# Patient Record
Sex: MALE | Race: BLACK OR AFRICAN AMERICAN | NOT HISPANIC OR LATINO
[De-identification: names, ages, dates, MRNs, and addresses within clinical notes are randomized per-mention and may not be internally consistent; named-entity substitution may affect disease eponyms.]

---

## 2017-01-20 ENCOUNTER — RECORD ABSTRACTING (OUTPATIENT)
Age: 67
End: 2017-01-20

## 2017-01-20 DIAGNOSIS — R20.2 PARESTHESIA OF SKIN: ICD-10-CM

## 2017-01-20 DIAGNOSIS — Z82.49 FAMILY HISTORY OF ISCHEMIC HEART DISEASE AND OTHER DISEASES OF THE CIRCULATORY SYSTEM: ICD-10-CM

## 2017-01-20 DIAGNOSIS — Z84.89 FAMILY HISTORY OF OTHER SPECIFIED CONDITIONS: ICD-10-CM

## 2017-01-20 DIAGNOSIS — Z92.89 PERSONAL HISTORY OF OTHER MEDICAL TREATMENT: ICD-10-CM

## 2017-02-02 ENCOUNTER — APPOINTMENT (OUTPATIENT)
Dept: CARDIOLOGY | Facility: CLINIC | Age: 67
End: 2017-02-02

## 2018-07-11 ENCOUNTER — APPOINTMENT (OUTPATIENT)
Dept: CARDIOLOGY | Facility: CLINIC | Age: 68
End: 2018-07-11

## 2018-07-11 VITALS
BODY MASS INDEX: 30 KG/M2 | DIASTOLIC BLOOD PRESSURE: 78 MMHG | WEIGHT: 163 LBS | SYSTOLIC BLOOD PRESSURE: 128 MMHG | HEIGHT: 62 IN | HEART RATE: 61 BPM

## 2018-07-11 RX ORDER — SITAGLIPTIN 100 MG/1
100 TABLET, FILM COATED ORAL DAILY
Refills: 0 | Status: ACTIVE | COMMUNITY

## 2018-08-28 ENCOUNTER — APPOINTMENT (OUTPATIENT)
Dept: CARDIOLOGY | Facility: CLINIC | Age: 68
End: 2018-08-28

## 2019-01-08 ENCOUNTER — APPOINTMENT (OUTPATIENT)
Dept: CARDIOLOGY | Facility: CLINIC | Age: 69
End: 2019-01-08

## 2019-01-08 VITALS
BODY MASS INDEX: 29.08 KG/M2 | HEIGHT: 62 IN | DIASTOLIC BLOOD PRESSURE: 90 MMHG | HEART RATE: 87 BPM | SYSTOLIC BLOOD PRESSURE: 128 MMHG | WEIGHT: 158 LBS

## 2019-01-08 RX ORDER — VERAPAMIL HYDROCHLORIDE 120 MG/1
120 TABLET ORAL DAILY
Refills: 0 | Status: DISCONTINUED | COMMUNITY
End: 2019-01-08

## 2019-01-08 RX ORDER — ASPIRIN 81 MG
81 TABLET, DELAYED RELEASE (ENTERIC COATED) ORAL DAILY
Refills: 0 | Status: ACTIVE | COMMUNITY

## 2019-01-08 NOTE — HISTORY OF PRESENT ILLNESS
[FreeTextEntry1] : The patient has been feeling well. No chest pain  Said to have nonobstructive carotid disease.

## 2019-01-08 NOTE — ASSESSMENT
[FreeTextEntry1] : The patient has not had chest pain . ETT echo was negative for echo ischemia at high exercise load. . He has risk factors for CAD and has some degree of carotid disease. /

## 2019-01-08 NOTE — PHYSICAL EXAM
[General Appearance - Well Developed] : well developed [Normal Appearance] : normal appearance [Well Groomed] : well groomed [General Appearance - Well Nourished] : well nourished [No Deformities] : no deformities [General Appearance - In No Acute Distress] : no acute distress [Normal Conjunctiva] : the conjunctiva exhibited no abnormalities [Eyelids - No Xanthelasma] : the eyelids demonstrated no xanthelasmas [Normal Oral Mucosa] : normal oral mucosa [No Oral Pallor] : no oral pallor [No Oral Cyanosis] : no oral cyanosis [Respiration, Rhythm And Depth] : normal respiratory rhythm and effort [Exaggerated Use Of Accessory Muscles For Inspiration] : no accessory muscle use [Auscultation Breath Sounds / Voice Sounds] : lungs were clear to auscultation bilaterally [Abdomen Soft] : soft [Abdomen Tenderness] : non-tender [Abdomen Mass (___ Cm)] : no abdominal mass palpated [Abnormal Walk] : normal gait [Nail Clubbing] : no clubbing of the fingernails [Cyanosis, Localized] : no localized cyanosis [Petechial Hemorrhages (___cm)] : no petechial hemorrhages [Skin Color & Pigmentation] : normal skin color and pigmentation [] : no rash [No Venous Stasis] : no venous stasis [Skin Lesions] : no skin lesions [No Skin Ulcers] : no skin ulcer [No Xanthoma] : no  xanthoma was observed [Oriented To Time, Place, And Person] : oriented to person, place, and time [Affect] : the affect was normal [Mood] : the mood was normal [No Anxiety] : not feeling anxious [Normal Rate] : normal [Rhythm Regular] : regular [No Murmur] : no murmurs heard [2+] : left 2+ [No Pitting Edema] : no pitting edema present [FreeTextEntry1] : No JVD [Rt] : no varicose veins of the right leg [Lt] : no varicose veins of the left leg

## 2019-01-08 NOTE — REASON FOR VISIT
[Follow-Up - Clinic] : a clinic follow-up of [Carotid Artery Stenosis] : carotid stenosis [Hyperlipidemia] : hyperlipidemia [Hypertension] : hypertension

## 2019-07-08 ENCOUNTER — APPOINTMENT (OUTPATIENT)
Dept: CARDIOLOGY | Facility: CLINIC | Age: 69
End: 2019-07-08

## 2020-09-07 ENCOUNTER — INPATIENT (INPATIENT)
Facility: HOSPITAL | Age: 70
LOS: 1 days | Discharge: HOME | End: 2020-09-09
Attending: HOSPITALIST | Admitting: HOSPITALIST
Payer: MEDICARE

## 2020-09-07 VITALS
TEMPERATURE: 99 F | SYSTOLIC BLOOD PRESSURE: 154 MMHG | HEART RATE: 60 BPM | OXYGEN SATURATION: 99 % | RESPIRATION RATE: 16 BRPM | DIASTOLIC BLOOD PRESSURE: 78 MMHG

## 2020-09-07 DIAGNOSIS — Z88.2 ALLERGY STATUS TO SULFONAMIDES: ICD-10-CM

## 2020-09-07 LAB
ALBUMIN SERPL ELPH-MCNC: 4.9 G/DL — SIGNIFICANT CHANGE UP (ref 3.5–5.2)
ALP SERPL-CCNC: 52 U/L — SIGNIFICANT CHANGE UP (ref 30–115)
ALT FLD-CCNC: 34 U/L — SIGNIFICANT CHANGE UP (ref 0–41)
ANION GAP SERPL CALC-SCNC: 15 MMOL/L — HIGH (ref 7–14)
AST SERPL-CCNC: 138 U/L — HIGH (ref 0–41)
BASOPHILS # BLD AUTO: 0.02 K/UL — SIGNIFICANT CHANGE UP (ref 0–0.2)
BASOPHILS NFR BLD AUTO: 0.3 % — SIGNIFICANT CHANGE UP (ref 0–1)
BILIRUB DIRECT SERPL-MCNC: 0.2 MG/DL — SIGNIFICANT CHANGE UP (ref 0–0.2)
BILIRUB INDIRECT FLD-MCNC: 0.8 MG/DL — SIGNIFICANT CHANGE UP (ref 0.2–1.2)
BILIRUB SERPL-MCNC: 1 MG/DL — SIGNIFICANT CHANGE UP (ref 0.2–1.2)
BUN SERPL-MCNC: 17 MG/DL — SIGNIFICANT CHANGE UP (ref 10–20)
CALCIUM SERPL-MCNC: 9.2 MG/DL — SIGNIFICANT CHANGE UP (ref 8.5–10.1)
CHLORIDE SERPL-SCNC: 99 MMOL/L — SIGNIFICANT CHANGE UP (ref 98–110)
CO2 SERPL-SCNC: 22 MMOL/L — SIGNIFICANT CHANGE UP (ref 17–32)
CREAT SERPL-MCNC: 0.8 MG/DL — SIGNIFICANT CHANGE UP (ref 0.7–1.5)
EOSINOPHIL # BLD AUTO: 0 K/UL — SIGNIFICANT CHANGE UP (ref 0–0.7)
EOSINOPHIL NFR BLD AUTO: 0 % — SIGNIFICANT CHANGE UP (ref 0–8)
GLUCOSE BLDC GLUCOMTR-MCNC: 183 MG/DL — HIGH (ref 70–99)
GLUCOSE BLDC GLUCOMTR-MCNC: 192 MG/DL — HIGH (ref 70–99)
GLUCOSE SERPL-MCNC: 225 MG/DL — HIGH (ref 70–99)
HCT VFR BLD CALC: 43 % — SIGNIFICANT CHANGE UP (ref 42–52)
HGB BLD-MCNC: 14.3 G/DL — SIGNIFICANT CHANGE UP (ref 14–18)
IMM GRANULOCYTES NFR BLD AUTO: 0.3 % — SIGNIFICANT CHANGE UP (ref 0.1–0.3)
LACTATE SERPL-SCNC: 2.8 MMOL/L — HIGH (ref 0.7–2)
LIDOCAIN IGE QN: 21 U/L — SIGNIFICANT CHANGE UP (ref 7–60)
LYMPHOCYTES # BLD AUTO: 0.98 K/UL — LOW (ref 1.2–3.4)
LYMPHOCYTES # BLD AUTO: 14.6 % — LOW (ref 20.5–51.1)
MCHC RBC-ENTMCNC: 32.7 PG — HIGH (ref 27–31)
MCHC RBC-ENTMCNC: 33.3 G/DL — SIGNIFICANT CHANGE UP (ref 32–37)
MCV RBC AUTO: 98.4 FL — HIGH (ref 80–94)
MONOCYTES # BLD AUTO: 0.54 K/UL — SIGNIFICANT CHANGE UP (ref 0.1–0.6)
MONOCYTES NFR BLD AUTO: 8 % — SIGNIFICANT CHANGE UP (ref 1.7–9.3)
NEUTROPHILS # BLD AUTO: 5.15 K/UL — SIGNIFICANT CHANGE UP (ref 1.4–6.5)
NEUTROPHILS NFR BLD AUTO: 76.8 % — HIGH (ref 42.2–75.2)
NRBC # BLD: 0 /100 WBCS — SIGNIFICANT CHANGE UP (ref 0–0)
PLATELET # BLD AUTO: 204 K/UL — SIGNIFICANT CHANGE UP (ref 130–400)
POTASSIUM SERPL-MCNC: 4.7 MMOL/L — SIGNIFICANT CHANGE UP (ref 3.5–5)
POTASSIUM SERPL-SCNC: 4.7 MMOL/L — SIGNIFICANT CHANGE UP (ref 3.5–5)
PROT SERPL-MCNC: 7 G/DL — SIGNIFICANT CHANGE UP (ref 6–8)
RBC # BLD: 4.37 M/UL — LOW (ref 4.7–6.1)
RBC # FLD: 11.1 % — LOW (ref 11.5–14.5)
SARS-COV-2 RNA SPEC QL NAA+PROBE: SIGNIFICANT CHANGE UP
SODIUM SERPL-SCNC: 136 MMOL/L — SIGNIFICANT CHANGE UP (ref 135–146)
TROPONIN T SERPL-MCNC: 0.8 NG/ML — CRITICAL HIGH
WBC # BLD: 6.71 K/UL — SIGNIFICANT CHANGE UP (ref 4.8–10.8)
WBC # FLD AUTO: 6.71 K/UL — SIGNIFICANT CHANGE UP (ref 4.8–10.8)

## 2020-09-07 PROCEDURE — 93010 ELECTROCARDIOGRAM REPORT: CPT

## 2020-09-07 PROCEDURE — 76705 ECHO EXAM OF ABDOMEN: CPT | Mod: 26

## 2020-09-07 PROCEDURE — 99222 1ST HOSP IP/OBS MODERATE 55: CPT | Mod: 57

## 2020-09-07 PROCEDURE — 92941 PRQ TRLML REVSC TOT OCCL AMI: CPT | Mod: RC

## 2020-09-07 PROCEDURE — 71045 X-RAY EXAM CHEST 1 VIEW: CPT | Mod: 26

## 2020-09-07 PROCEDURE — 93458 L HRT ARTERY/VENTRICLE ANGIO: CPT | Mod: 26,XU

## 2020-09-07 PROCEDURE — 99285 EMERGENCY DEPT VISIT HI MDM: CPT | Mod: CS,25,GC

## 2020-09-07 RX ORDER — SODIUM CHLORIDE 9 MG/ML
1000 INJECTION, SOLUTION INTRAVENOUS
Refills: 0 | Status: DISCONTINUED | OUTPATIENT
Start: 2020-09-07 | End: 2020-09-09

## 2020-09-07 RX ORDER — SODIUM CHLORIDE 9 MG/ML
1000 INJECTION INTRAMUSCULAR; INTRAVENOUS; SUBCUTANEOUS
Refills: 0 | Status: DISCONTINUED | OUTPATIENT
Start: 2020-09-07 | End: 2020-09-08

## 2020-09-07 RX ORDER — ONDANSETRON 8 MG/1
4 TABLET, FILM COATED ORAL ONCE
Refills: 0 | Status: COMPLETED | OUTPATIENT
Start: 2020-09-07 | End: 2020-09-07

## 2020-09-07 RX ORDER — INFLUENZA VIRUS VACCINE 15; 15; 15; 15 UG/.5ML; UG/.5ML; UG/.5ML; UG/.5ML
0.5 SUSPENSION INTRAMUSCULAR ONCE
Refills: 0 | Status: COMPLETED | OUTPATIENT
Start: 2020-09-07 | End: 2020-09-09

## 2020-09-07 RX ORDER — DEXTROSE 50 % IN WATER 50 %
25 SYRINGE (ML) INTRAVENOUS ONCE
Refills: 0 | Status: DISCONTINUED | OUTPATIENT
Start: 2020-09-07 | End: 2020-09-09

## 2020-09-07 RX ORDER — TICAGRELOR 90 MG/1
90 TABLET ORAL EVERY 12 HOURS
Refills: 0 | Status: DISCONTINUED | OUTPATIENT
Start: 2020-09-08 | End: 2020-09-08

## 2020-09-07 RX ORDER — ASPIRIN/CALCIUM CARB/MAGNESIUM 324 MG
81 TABLET ORAL DAILY
Refills: 0 | Status: DISCONTINUED | OUTPATIENT
Start: 2020-09-08 | End: 2020-09-09

## 2020-09-07 RX ORDER — HYOSCYAMINE SULFATE 0.13 MG
1 TABLET ORAL
Qty: 0 | Refills: 0 | DISCHARGE

## 2020-09-07 RX ORDER — INSULIN GLARGINE 100 [IU]/ML
14 INJECTION, SOLUTION SUBCUTANEOUS AT BEDTIME
Refills: 0 | Status: DISCONTINUED | OUTPATIENT
Start: 2020-09-07 | End: 2020-09-08

## 2020-09-07 RX ORDER — GLUCAGON INJECTION, SOLUTION 0.5 MG/.1ML
1 INJECTION, SOLUTION SUBCUTANEOUS ONCE
Refills: 0 | Status: DISCONTINUED | OUTPATIENT
Start: 2020-09-07 | End: 2020-09-09

## 2020-09-07 RX ORDER — ATORVASTATIN CALCIUM 80 MG/1
80 TABLET, FILM COATED ORAL AT BEDTIME
Refills: 0 | Status: DISCONTINUED | OUTPATIENT
Start: 2020-09-07 | End: 2020-09-09

## 2020-09-07 RX ORDER — FAMOTIDINE 10 MG/ML
20 INJECTION INTRAVENOUS ONCE
Refills: 0 | Status: COMPLETED | OUTPATIENT
Start: 2020-09-07 | End: 2020-09-07

## 2020-09-07 RX ORDER — INSULIN LISPRO 100/ML
VIAL (ML) SUBCUTANEOUS AT BEDTIME
Refills: 0 | Status: DISCONTINUED | OUTPATIENT
Start: 2020-09-07 | End: 2020-09-08

## 2020-09-07 RX ORDER — INSULIN LISPRO 100/ML
4 VIAL (ML) SUBCUTANEOUS
Refills: 0 | Status: DISCONTINUED | OUTPATIENT
Start: 2020-09-07 | End: 2020-09-09

## 2020-09-07 RX ORDER — DEXTROSE 50 % IN WATER 50 %
15 SYRINGE (ML) INTRAVENOUS ONCE
Refills: 0 | Status: DISCONTINUED | OUTPATIENT
Start: 2020-09-07 | End: 2020-09-09

## 2020-09-07 RX ORDER — METOPROLOL TARTRATE 50 MG
25 TABLET ORAL
Refills: 0 | Status: DISCONTINUED | OUTPATIENT
Start: 2020-09-07 | End: 2020-09-09

## 2020-09-07 RX ORDER — DEXTROSE 50 % IN WATER 50 %
12.5 SYRINGE (ML) INTRAVENOUS ONCE
Refills: 0 | Status: DISCONTINUED | OUTPATIENT
Start: 2020-09-07 | End: 2020-09-09

## 2020-09-07 RX ORDER — LOSARTAN POTASSIUM 100 MG/1
50 TABLET, FILM COATED ORAL DAILY
Refills: 0 | Status: DISCONTINUED | OUTPATIENT
Start: 2020-09-08 | End: 2020-09-09

## 2020-09-07 RX ORDER — METOPROLOL TARTRATE 50 MG
25 TABLET ORAL
Refills: 0 | Status: DISCONTINUED | OUTPATIENT
Start: 2020-09-07 | End: 2020-09-07

## 2020-09-07 RX ORDER — ASPIRIN/CALCIUM CARB/MAGNESIUM 324 MG
325 TABLET ORAL ONCE
Refills: 0 | Status: COMPLETED | OUTPATIENT
Start: 2020-09-07 | End: 2020-09-07

## 2020-09-07 RX ORDER — PANTOPRAZOLE SODIUM 20 MG/1
1 TABLET, DELAYED RELEASE ORAL
Qty: 0 | Refills: 0 | DISCHARGE

## 2020-09-07 RX ORDER — CHLORHEXIDINE GLUCONATE 213 G/1000ML
1 SOLUTION TOPICAL
Refills: 0 | Status: DISCONTINUED | OUTPATIENT
Start: 2020-09-07 | End: 2020-09-09

## 2020-09-07 RX ORDER — FLUTICASONE PROPIONATE 220 MCG
1 AEROSOL WITH ADAPTER (GRAM) INHALATION
Qty: 0 | Refills: 0 | DISCHARGE

## 2020-09-07 RX ORDER — SODIUM CHLORIDE 9 MG/ML
1000 INJECTION INTRAMUSCULAR; INTRAVENOUS; SUBCUTANEOUS ONCE
Refills: 0 | Status: COMPLETED | OUTPATIENT
Start: 2020-09-07 | End: 2020-09-07

## 2020-09-07 RX ORDER — ENOXAPARIN SODIUM 100 MG/ML
40 INJECTION SUBCUTANEOUS DAILY
Refills: 0 | Status: DISCONTINUED | OUTPATIENT
Start: 2020-09-07 | End: 2020-09-09

## 2020-09-07 RX ORDER — SITAGLIPTIN 50 MG/1
1 TABLET, FILM COATED ORAL
Qty: 0 | Refills: 0 | DISCHARGE

## 2020-09-07 RX ADMIN — SODIUM CHLORIDE 1000 MILLILITER(S): 9 INJECTION INTRAMUSCULAR; INTRAVENOUS; SUBCUTANEOUS at 15:36

## 2020-09-07 RX ADMIN — ONDANSETRON 4 MILLIGRAM(S): 8 TABLET, FILM COATED ORAL at 15:35

## 2020-09-07 RX ADMIN — FAMOTIDINE 20 MILLIGRAM(S): 10 INJECTION INTRAVENOUS at 15:36

## 2020-09-07 RX ADMIN — ATORVASTATIN CALCIUM 80 MILLIGRAM(S): 80 TABLET, FILM COATED ORAL at 21:34

## 2020-09-07 RX ADMIN — Medication 325 MILLIGRAM(S): at 19:17

## 2020-09-07 RX ADMIN — SODIUM CHLORIDE 75 MILLILITER(S): 9 INJECTION INTRAMUSCULAR; INTRAVENOUS; SUBCUTANEOUS at 21:53

## 2020-09-07 NOTE — H&P ADULT - NSHPPHYSICALEXAM_GEN_ALL_CORE
PHYSICAL EXAM:  GENERAL: NAD, lying in bed comfortably  CHEST/LUNG: Clear to auscultation bilaterally; s. Unlabored respirations  HEART: Regular rate and rhythm; No murmurs, rubs, or gallops  ABDOMEN: Bowel sounds present; Soft, Nontender, Nondistended. No hepatomegaly  EXTREMITIES:  2+ Peripheral Pulses, brisk capillary refill. No clubbing, cyanosis, or edema  NERVOUS SYSTEM:  Alert & Oriented X3, speech clear. No deficits   MSK: FROM all 4 extremities, full and equal strength

## 2020-09-07 NOTE — CHART NOTE - NSCHARTNOTEFT_GEN_A_CORE
PRE-OP DIAGNOSIS: NSTEMI    PROCEDURE:[X] LHC                         [X] Coronary angiography                         [  ] [facility]  Physician: Dr. Noel  [place]: Dr. Barbosa / Dr. Seals    ANESTHESIA TYPE:  [  ]General Anesthesia  [ x ] Sedation  [  ] Local/Regional    ESTIMATED BLOOD LOSS:    10   mL    CONDITION  [  ] Critical  [  ] Serious  [  ]Fair  [X]Good    IV CONTRAST:      130       mL    FINDINGS  Left Heart Catheterization:  LVEF%: 60  LVEDP:  Significant single vessel disease s/p PCI    ACCESS:    [X] right radial artery  [ ] right femoral artery    LEFT HEART CATHETERIZATION                                    Left main:   LAD:  Prox: Mild luminal irregularities  Mid: 2 lesions, 30% and 50% lesions  Distal: mild luminal irregularities  Diag: ostial and prox segment 80% stenosis    Left Circumflex: Mild luminal irregularities  OM: Mild luminal irregularities    Right Coronary Artery: 100% mid RCA thrombotic occlusion, s/p PCI; moderate occlusive disease   RPLS severe atherosclerosis      INTERVENTION: PCI to RCA  IMPLANTS: MANASA to RCA    SPECIMEN REMOVED: N/A      POST-OP DIAGNOSIS: NSTEMI      PLAN OF CARE  - Admit to CCU  - NS at 75cc/hr  - Aspirin 81mg daily, Ticagrelor 90mg BID, metoprolol tartrate 25mg BID, Losartan 50mg daily (to start on 9/7/2020 and hold if SBP<100 mmHg), switch to Atorvastatin 80mg at bedtime  - Check cbc, cmp and cardiac enzymes in 4 hours and in AM  - Check EKG upon arrival to CCU and daily while in CCU PRE-OP DIAGNOSIS: NSTEMI    PROCEDURE:[X] LHC                         [X] Coronary angiography                         [  ] [facility]  Physician: Dr. Noel  [place]: Dr. Barbosa / Dr. Seals    ANESTHESIA TYPE:  [  ]General Anesthesia  [ x ] Sedation  [  ] Local/Regional    ESTIMATED BLOOD LOSS:    10   mL    CONDITION  [  ] Critical  [  ] Serious  [  ]Fair  [X]Good    IV CONTRAST:      130       mL    FINDINGS  Left Heart Catheterization:  LVEF%: 60  LVEDP:  Significant single vessel disease s/p PCI    ACCESS:    [X] right radial artery  [ ] right femoral artery    LEFT HEART CATHETERIZATION                                    Left main:   LAD:  Prox: Mild luminal irregularities  Mid: 2 lesions, 30% and 50% lesions  Distal: mild luminal irregularities  Diag: ostial and prox segment 80% stenosis    Left Circumflex: Mild luminal irregularities  OM: Mild luminal irregularities    Right Coronary Artery: 100% mid RCA thrombotic occlusion, s/p PCI; moderate occlusive disease   RPLS severe atherosclerosis      INTERVENTION: PCI to RCA  IMPLANTS: MANASA to RCA    SPECIMEN REMOVED: N/A      POST-OP DIAGNOSIS: NSTEMI      PLAN OF CARE  - Admit to CCU  - NS at 75cc/hr  - Aspirin 81mg daily, Ticagrelor 90mg BID, metoprolol tartrate 25mg BID, Losartan 50mg daily (to start on 9/7/2020 and hold if SBP<100 mmHg), switch to Atorvastatin 80mg at bedtime  - Check cbc, cmp and cardiac enzymes in 4 hours and in AM  - Check EKG upon arrival to CCU and daily while in CCU  - Give insulin for DM; hold metformin for 48 hours  - DVT prophylaxis PRE-OP DIAGNOSIS: NSTEMI    PROCEDURE:[X] LHC                         [X] Coronary angiography                         [  ] [facility]  Physician: Dr. Noel  [place]: Dr. Barbosa / Dr. Seals    ANESTHESIA TYPE:  [  ]General Anesthesia  [ x ] Sedation  [  ] Local/Regional    ESTIMATED BLOOD LOSS:    10   mL    CONDITION  [  ] Critical  [  ] Serious  [  ]Fair  [X]Good    IV CONTRAST:      130       mL    FINDINGS  Left Heart Catheterization:  LVEF%: 60  LVEDP:  Significant single vessel disease s/p PCI    ACCESS:    [X] right radial artery  [ ] right femoral artery    LEFT HEART CATHETERIZATION                                    Left main:   LAD:  Prox: Mild luminal irregularities  Mid: 2 lesions, 30% and 50% lesions  Distal: mild luminal irregularities  Diag: ostial and prox segment 80% stenosis    Left Circumflex: Mild luminal irregularities  OM: Mild luminal irregularities    Right Coronary Artery: 100% mid RCA thrombotic occlusion, s/p PCI; moderate occlusive disease   RPLS severe atherosclerosis      INTERVENTION: PCI to RCA  IMPLANTS: MANASA to RCA    SPECIMEN REMOVED: N/A      POST-OP DIAGNOSIS: NSTEMI      PLAN OF CARE  - Admit to CCU  - NS at 75cc/hr  - Aspirin 81mg daily, Ticagrelor 90mg BID, metoprolol tartrate 25mg BID, Losartan 50mg daily (to start on 9/7/2020 and hold if SBP<100 mmHg), switch to Atorvastatin 80mg at bedtime  - Check cbc, cmp and cardiac enzymes in 4 hours and in AM  - Check EKG upon arrival to CCU and daily while in CCU  - Give insulin for DM; hold metformin for 48 hours  - DVT prophylaxis  - Check 2D echo PRE-OP DIAGNOSIS: NSTEMI    PROCEDURE:[X] LHC                         [X] Coronary angiography                         [  ] [facility]  Physician: Dr. Noel  [place]: Dr. Barbosa / Dr. Seals    ANESTHESIA TYPE:  [  ]General Anesthesia  [ x ] Sedation  [  ] Local/Regional    ESTIMATED BLOOD LOSS:    10   mL    CONDITION  [  ] Critical  [  ] Serious  [  ]Fair  [X]Good    IV CONTRAST:      130       mL    FINDINGS  Left Heart Catheterization:  LVEF%: 60  LVEDP:  Significant single vessel disease s/p PCI    ACCESS:    [X] right radial artery  [ ] right femoral artery    LEFT HEART CATHETERIZATION                                    Left main:   LAD:  Prox: Mild luminal irregularities  Mid: 2 lesions, 30% and 50% lesions  Distal: mild luminal irregularities  Diag: ostial and prox segment 80% stenosis    Left Circumflex: Mild luminal irregularities  OM: Mild luminal irregularities    Right Coronary Artery: 100% mid RCA thrombotic occlusion, s/p PCI; moderate occlusive disease   RPLS severe atherosclerosis      INTERVENTION: PCI to RCA  IMPLANTS: MANASA to RCA    SPECIMEN REMOVED: N/A      POST-OP DIAGNOSIS: NSTEMI      PLAN OF CARE  - Admit to CCU  - NS at 75cc/hr  - Aspirin 81mg daily, Ticagrelor 90mg BID, metoprolol tartrate 25mg BID and HOLD verapamil, Losartan 50mg daily (to start on 9/7/2020 and hold if SBP<100 mmHg), switch to Atorvastatin 80mg at bedtime  - Check cbc, cmp and cardiac enzymes in 4 hours and in AM  - Check EKG upon arrival to CCU and daily while in CCU  - Give insulin for DM; hold metformin for 48 hours  - DVT prophylaxis  - Check 2D echo

## 2020-09-07 NOTE — ED ADULT NURSE NOTE - OBJECTIVE STATEMENT
Pt presents to ED c/o upper abdominal pain since yesterday that is worse after eating; pt also reports nausea, denies vomiting/diarrhea. Pt denies chest pain/SOB/headache/dizziness.

## 2020-09-07 NOTE — ED PROCEDURE NOTE - PROCEDURE ADDITIONAL DETAILS
limited ruq sono with no gallstones transverse and sagital views of gallbladder obtained. no stones. no cbd dilation.

## 2020-09-07 NOTE — CONSULT NOTE ADULT - SUBJECTIVE AND OBJECTIVE BOX
HPI:  69 y.o male patient with PMH of DM, HTN, "irregular heart beat", GERD, diverticulosis presented for non-resolving epigastric pain.  History goes back to the day prior to presentation at 4pm when the patient had barbecue. Following the meal, he suddenly felt weak and started sweating profusely, associated with epigastric pain. Episode lasted for about 30 minutes but the pain remained the same. He attributed to his regular indigestion and heartburn but the pain didn't resolve with pepcid and lasted the whole night. For this reason, he decided to present to the ED.  Patient describes the pain as sharp and pressure-like pain, epigastric, non-radiating, with no alleviating or relieving factors, associated with nausea. He denies fever, chills, melena, bright red blood per rectum or any other significant symptoms    PAST MEDICAL & SURGICAL HISTORY  Diverticulosis  GERD (gastroesophageal reflux disease)  Diabetes      FAMILY HISTORY:  FAMILY HISTORY:      SOCIAL HISTORY:  []smoker: never  []Alcohol: none  []Drug: none    ALLERGIES:  No Known Allergies      MEDICATIONS:  MEDICATIONS  (STANDING):    MEDICATIONS  (PRN):      HOME MEDICATIONS:  Home Medications:      VITALS:   T(F): 98.8 (09-07 @ 14:40), Max: 98.8 (09-07 @ 14:40)  HR: 60 (09-07 @ 14:40) (60 - 60)  BP: 154/78 (09-07 @ 14:40) (154/78 - 154/78)  BP(mean): --  RR: 16 (09-07 @ 14:40) (16 - 16)  SpO2: 99% (09-07 @ 14:40) (99% - 99%)    I&O's Summary      REVIEW OF SYSTEMS:  CONSTITUTIONAL: No weakness, fevers or chills  EYES: No visual changes  ENT: No vertigo or throat pain   NECK: No pain or stiffness  RESPIRATORY: No cough, wheezing, hemoptysis; No shortness of breath  CARDIOVASCULAR: Chest pain as described in HPI  GASTROINTESTINAL: No abdominal or epigastric pain. No nausea, vomiting, or hematemesis; No diarrhea or constipation. No melena or hematochezia.  GENITOURINARY: No dysuria, frequency or hematuria  NEUROLOGICAL: No numbness or weakness  SKIN: No itching, no rashes  MSK: No pain    PHYSICAL EXAM:  NEURO: patient is awake , alert and oriented  GEN: Not in acute distress  NECK: no thyroid enlargement, no JVD  LUNGS: Clear to auscultation bilaterally   CARDIOVASCULAR: S1/S2 present, RRR  ABD: Soft, non-tender, non-distended, mild epigastric tenderness  EXT: No TAMMIE  SKIN: Intact    LABS:                        14.3   6.71  )-----------( 204      ( 07 Sep 2020 15:14 )             43.0     09-07    136  |  99  |  17  ----------------------------<  225<H>  4.7   |  22  |  0.8    Ca    9.2      07 Sep 2020 15:14    TPro  7.0  /  Alb  4.9  /  TBili  1.0  /  DBili  0.2  /  AST  138<H>  /  ALT  34  /  AlkPhos  52  09-07      Lactate, Blood: 2.8 mmol/L <H> (09-07-20 @ 15:14)  Troponin T, Serum: 0.80 ng/mL <HH> (09-07-20 @ 15:14)    CARDIAC MARKERS ( 07 Sep 2020 15:14 )  x     / 0.80 ng/mL / x     / x     / x            Troponin trend:        RADIOLOGY:  -CXR:  -TTE:  -CCTA:  -STRESS TEST:  -CATHETERIZATION:    ECG: NSR, T-wave inversions in inferior leads    TELEMETRY EVENTS: HPI:  69 y.o male patient with PMH of DM, HTN, "irregular heart beat", GERD, diverticulosis presented for non-resolving epigastric pain.  History goes back to the day prior to presentation at 4pm when the patient had barbecue. Following the meal, he suddenly felt weak and started sweating profusely, associated with epigastric pain. Episode lasted for about 30 minutes but the pain remained the same. He attributed to his regular indigestion and heartburn but the pain didn't resolve with pepcid and lasted the whole night. For this reason, he decided to present to the ED.  Patient describes the pain as sharp and pressure-like pain, epigastric, non-radiating, with no alleviating or relieving factors, associated with nausea. He denies fever, chills, melena, bright red blood per rectum or any other significant symptoms    PAST MEDICAL & SURGICAL HISTORY  Diverticulosis  GERD (gastroesophageal reflux disease)  Diabetes      FAMILY HISTORY:  FAMILY HISTORY:      SOCIAL HISTORY:  []smoker: never  []Alcohol: none  []Drug: none    ALLERGIES:  No Known Allergies      MEDICATIONS:  MEDICATIONS  (STANDING):    MEDICATIONS  (PRN):      HOME MEDICATIONS:  Home Medications:      VITALS:   T(F): 98.8 (09-07 @ 14:40), Max: 98.8 (09-07 @ 14:40)  HR: 60 (09-07 @ 14:40) (60 - 60)  BP: 154/78 (09-07 @ 14:40) (154/78 - 154/78)  BP(mean): --  RR: 16 (09-07 @ 14:40) (16 - 16)  SpO2: 99% (09-07 @ 14:40) (99% - 99%)    I&O's Summary      REVIEW OF SYSTEMS:  CONSTITUTIONAL: No weakness, fevers or chills  EYES: No visual changes  ENT: No vertigo or throat pain   NECK: No pain or stiffness  RESPIRATORY: No cough, wheezing, hemoptysis; No shortness of breath  CARDIOVASCULAR: Chest pain as described in HPI  GASTROINTESTINAL: No abdominal or epigastric pain. No nausea, vomiting, or hematemesis; No diarrhea or constipation. No melena or hematochezia.  GENITOURINARY: No dysuria, frequency or hematuria  NEUROLOGICAL: No numbness or weakness  SKIN: No itching, no rashes  MSK: No pain    PHYSICAL EXAM:  NEURO: patient is awake , alert and oriented  GEN: Not in acute distress  HEENT: NC/AT  NECK: no thyroid enlargement, no JVD  LUNGS: Clear to auscultation bilaterally   CARDIOVASCULAR: S1/S2 present, RRR  ABD: Soft, non-tender, non-distended, mild epigastric tenderness  EXT/MSK: No TAMMIE  SKIN: Intact    LABS:                        14.3   6.71  )-----------( 204      ( 07 Sep 2020 15:14 )             43.0     09-07    136  |  99  |  17  ----------------------------<  225<H>  4.7   |  22  |  0.8    Ca    9.2      07 Sep 2020 15:14    TPro  7.0  /  Alb  4.9  /  TBili  1.0  /  DBili  0.2  /  AST  138<H>  /  ALT  34  /  AlkPhos  52  09-07      Lactate, Blood: 2.8 mmol/L <H> (09-07-20 @ 15:14)  Troponin T, Serum: 0.80 ng/mL <HH> (09-07-20 @ 15:14)    CARDIAC MARKERS ( 07 Sep 2020 15:14 )  x     / 0.80 ng/mL / x     / x     / x            Troponin trend:        RADIOLOGY:  -CXR:  -TTE:  -CCTA:  -STRESS TEST:  -CATHETERIZATION:    ECG: NSR, T-wave inversions in inferior leads    TELEMETRY EVENTS:

## 2020-09-07 NOTE — CONSULT NOTE ADULT - ASSESSMENT
Consistent with delayed presentation of IWMI  ECG with inf/lateral ischemic changes  + troponin      Admit to telemetry  NPO for cardiac cath  Load with ASA  2d echo  C/w statin  DM control  Further recommendations after cath.  F/u with Dr. Bernard.

## 2020-09-07 NOTE — ED PROVIDER NOTE - NS ED ROS FT
Review of Systems:  •	CONSTITUTIONAL - No fever, No diaphoresis, No weight change  •	SKIN - No rash  •	HEMATOLOGIC - No abnormal bleeding or bruising  •	EYES - No eye pain, No blurred vision  •	ENT - No change in hearing, No sore throat, No neck pain, No rhinorrhea, No ear pain  •	RESPIRATORY - No shortness of breath, No cough  •	CARDIAC -No chest pain, No palpitations  •	GI - + epigastric abdominal pain, + nausea, No vomiting, No diarrhea, No constipation, No bright red blood per rectum or melena. No flank pain  •                 - No dysuria, frequency, hematuria.   •	ENDO - No polydypsia, No polyuria, No heat/cold intolerance  •	MUSCULOSKELETAL - No joint paint, No swelling, No back pain  •	NEUROLOGIC - No numbness, No focal weakness, No headache, No dizziness  All other systems negative, unless specified in HPI

## 2020-09-07 NOTE — ED PROVIDER NOTE - PHYSICAL EXAMINATION
CONSTITUTIONAL: Well-developed; well-nourished; in no acute distress.   SKIN: warm, dry  HEAD: Normocephalic; atraumatic.  EYES: no conjunctival injection. PERRL.   ENT: No nasal discharge; airway clear.  NECK: Supple; non tender.  CARD: S1, S2 normal; no murmurs, gallops, or rubs. Regular rate and rhythm.   RESP: No wheezes, rales or rhonchi.  ABD: soft, mildly tender in epigastric area, no rebound tenderness or guarding, no CVAT b/l  EXT: Normal ROM.  No clubbing, cyanosis or edema.   LYMPH: No acute cervical adenopathy.  NEURO: Alert, oriented, grossly unremarkable  PSYCH: Cooperative, appropriate.

## 2020-09-07 NOTE — ED PROVIDER NOTE - CARE PLAN
Principal Discharge DX:	NSTEMI (non-ST elevated myocardial infarction) Principal Discharge DX:	NSTEMI (non-ST elevated myocardial infarction)  Secondary Diagnosis:	Abdominal pain

## 2020-09-07 NOTE — H&P ADULT - HISTORY OF PRESENT ILLNESS
69 y.o male patient with PMH of DM, HTN, "irregular heart beat", GERD, diverticulosis presented for non-resolving epigastric pain.  History goes back to the day prior to presentation at 4pm when the patient had barbecue. Following the meal, he suddenly felt weak and started sweating profusely, associated with epigastric pain. Episode lasted for about 30 minutes but the pain remained the same. He attributed to his regular indigestion and heartburn but the pain didn't resolve with pepcid and lasted the whole night. For this reason, he decided to present to the ED.  Patient describes the pain as sharp and pressure-like pain, epigastric, non-radiating, with no alleviating or relieving factors, associated with nausea. He denies fever, chills, melena, bright red blood per rectum or any other significant symptoms

## 2020-09-07 NOTE — H&P ADULT - ASSESSMENT
#NSTEMI s/p cath   - ECG with inf/lateral ischemic changes  - elevated troponin  - Admit to telemetry    - Loaded with ASA  - fu 2d echo  - C/w statin  DM control  Further recommendations after cath.  F/u with Dr. Bernard.  - Admit to CCU  - NS at 75cc/hr  - Aspirin 81mg daily, Ticagrelor 90mg BID, metoprolol tartrate 25mg BID and HOLD verapamil, Losartan 50mg daily (to start on 9/7/2020 and hold if SBP<100 mmHg), switch to Atorvastatin 80mg at bedtime  - Check cbc, cmp and cardiac enzymes in 4 hours and in AM  - Check EKG upon arrival to CCU and daily while in CCU  - Give insulin for DM; hold metformin for 48 hours  - DVT prophylaxis  - Check 2D echo. #STEMI s/p cath   - ECG with inf/lateral ischemic changes prior to cath. EKG post cath was unchanged.   - elevated troponin  - Cath was done 9/7 with a MANASA placed on the RCA.   - started on Aspirin 81mg daily, Ticagrelor 90mg BID, metoprolol tartrate 25mg BID and HOLD verapamil, Losartan 50mg daily (to start on 9/7/2020 and hold if SBP<100 mmHg), switch to Atorvastatin 80mg at bedtime  - Continue NS at 75cc/hr   - fu cardiac enzymes in 4 hours and in AM  - fu EKG upon arrival to CCU and daily while in CCU  - fu 2d echo  - F/u with Dr. Bernard.    #DM  - Pateint is on metformin and januvia at home   - started on lantus and lispro with coverage  - monitor finger sticks       # GI PPx - Protonix   # DVT PPx - Lovenox 40 mg SubQ    # Activity -  (X) Increase as Tolerated  # Dispo -   Patient to be discharged when medically optimized.  # Code Status - (X) FULL      (X) Discussed Case and Plan with the Medical Attending.  . #STEMI s/p cath   - ECG with inf/lateral ischemic changes prior to cath. EKG post cath was unchanged.   - elevated troponin  - Cath was done 9/7 with a MANASA placed on the RCA.   - started on Aspirin 81mg daily, Ticagrelor 90mg BID, metoprolol tartrate 25mg BID and HOLD verapamil, Losartan 50mg daily (to start on 9/7/2020 and hold if SBP<100 mmHg), switch to Atorvastatin 80mg at bedtime  - Continue NS at 75cc/hr   - fu cardiac enzymes in 4 hours and in AM  - fu EKG upon arrival to CCU and daily while in CCU  - fu 2d echo  - F/u with Dr. Bernard.    #DM type 2 - uncontrolled  - Pateint is on metformin and januvia at home   - started on lantus and lispro with coverage  - monitor finger sticks       # GI PPx - Protonix   # DVT PPx - Lovenox 40 mg SubQ    # Activity -  (X) Increase as Tolerated  # Dispo -   Patient to be discharged when medically optimized.  # Code Status - (X) FULL      (X) Discussed Case and Plan with the Medical Attending.  .

## 2020-09-07 NOTE — H&P ADULT - ATTENDING COMMENTS
Pt seen and examined independently. He c/o dyspnea after Brilinta was started.   No chest pain or epigastric pain or other complaints.   Case discussed with cardiology NP who spoke to the cardiology attending - Brilinta will be discontinued and Effient will be started.     T(F): 99.3 (09-08-20 @ 09:34), Max: 100.1 (09-08-20 @ 04:00)  HR: 66 (09-08-20 @ 10:00) (48 - 68)  BP: 118/57 (09-08-20 @ 10:00) (116/44 - 154/78)  RR: 20 (09-08-20 @ 10:00) (16 - 21)  SpO2: 97% (09-08-20 @ 10:00) (97% - 100%) on RA    CAPILLARY BLOOD GLUCOSE  POCT Blood Glucose.: 184 mg/dL (08 Sep 2020 07:53)  POCT Blood Glucose.: 175 mg/dL (08 Sep 2020 00:15)  POCT Blood Glucose.: 192 mg/dL (07 Sep 2020 23:02)  POCT Blood Glucose.: 183 mg/dL (07 Sep 2020 19:39)    PHYSICAL EXAM:  GENERAL: NAD  HEAD:  Normocephalic  EYES:  conjunctiva and sclera clear  ENMT: Moist mucous membranes  NECK: Supple, No JVD  NERVOUS SYSTEM:  Alert & Oriented X3, Good concentration  CHEST/LUNG: Clear to percussion bilaterally; No rales, rhonchi, wheezing  HEART: Regular rate and rhythm; No murmurs  ABDOMEN: Soft, Nontender, Nondistended; Bowel sounds present  EXTREMITIES: No edema  right wrist + dressing    EKG reviewed  Telemetry reviewed    LABS:                        13.0   7.04  )-----------( 171      ( 08 Sep 2020 04:14 )             38.8     09-08    140  |  106  |  15  ----------------------------<  218<H>  4.0   |  21  |  0.9    Ca    8.7      08 Sep 2020 04:14  Mg     2.1     09-08    TPro  6.1  /  Alb  4.3  /  TBili  1.3<H>  /  DBili  x   /  AST  152<H>  /  ALT  35  /  AlkPhos  44  09-08      CARDIAC MARKERS ( 08 Sep 2020 04:14 )  x     / 2.38 ng/mL / x     / x     / x      CARDIAC MARKERS ( 08 Sep 2020 00:25 )  x     / 1.84 ng/mL / x     / x     / 184.6 ng/mL  CARDIAC MARKERS ( 07 Sep 2020 15:14 )  x     / 0.80 ng/mL / x     / x     / x          < from: Transthoracic Echocardiogram (09.08.20 @ 09:44) >    Summary:   1.Normal global left ventricular systolic function.   2. LV Ejection Fraction by [place]'s Method with a biplane EF of 71 %.   3. Normal left atrial size.   4. Normal right atrial size.   5. There is no evidence of pericardial effusion.   6. Mild thickening of the anterior and posterior mitral valve leaflets.   7. No evidence of mitral valve regurgitation.    < end of copied text >    Case discussed with resident  Care discussed with pt    Impression/Plan  1. STEMI s/p MANASA of RCA  CCU monitoring  continue ASA/metoprolol/atorvastatin  Effient now started in place of Brilinta due to dyspnea  losartan started and monitor BP  check lipid panel  cardio following  GI/DVT prophylaxis    2. DM type 2 on insulin - FS acceptable    3. HTN on metoprolol and losartan    I reviewed the resident's note and I agree with the history, physical exam, assessment and plan as outlined with additions as above.     PROGRESS NOTE HANDOFF    Pending: lipid panel, OOB and ambulate, resolution of dyspnea    Disposition: home Pt seen and examined independently. He c/o dyspnea after Brilinta was started.   No chest pain or epigastric pain or other complaints.   Case discussed with cardiology NP who spoke to the cardiology attending - Brilinta will be discontinued and Effient will be started.     T(F): 99.3 (09-08-20 @ 09:34), Max: 100.1 (09-08-20 @ 04:00)  HR: 66 (09-08-20 @ 10:00) (48 - 68)  BP: 118/57 (09-08-20 @ 10:00) (116/44 - 154/78)  RR: 20 (09-08-20 @ 10:00) (16 - 21)  SpO2: 97% (09-08-20 @ 10:00) (97% - 100%) on RA    CAPILLARY BLOOD GLUCOSE  POCT Blood Glucose.: 184 mg/dL (08 Sep 2020 07:53)  POCT Blood Glucose.: 175 mg/dL (08 Sep 2020 00:15)  POCT Blood Glucose.: 192 mg/dL (07 Sep 2020 23:02)  POCT Blood Glucose.: 183 mg/dL (07 Sep 2020 19:39)    PHYSICAL EXAM:  GENERAL: NAD  HEAD:  Normocephalic  EYES:  conjunctiva and sclera clear  ENMT: Moist mucous membranes  NECK: Supple, No JVD  NERVOUS SYSTEM:  Alert & Oriented X3, Good concentration  CHEST/LUNG: Clear to percussion bilaterally; No rales, rhonchi, wheezing  HEART: Regular rate and rhythm; No murmurs  ABDOMEN: Soft, Nontender, Nondistended; Bowel sounds present  EXTREMITIES: No edema  right wrist + dressing    EKG reviewed  Telemetry reviewed    LABS:                        13.0   7.04  )-----------( 171      ( 08 Sep 2020 04:14 )             38.8     09-08    140  |  106  |  15  ----------------------------<  218<H>  4.0   |  21  |  0.9    Ca    8.7      08 Sep 2020 04:14  Mg     2.1     09-08    TPro  6.1  /  Alb  4.3  /  TBili  1.3<H>  /  DBili  x   /  AST  152<H>  /  ALT  35  /  AlkPhos  44  09-08      CARDIAC MARKERS ( 08 Sep 2020 04:14 )  x     / 2.38 ng/mL / x     / x     / x      CARDIAC MARKERS ( 08 Sep 2020 00:25 )  x     / 1.84 ng/mL / x     / x     / 184.6 ng/mL  CARDIAC MARKERS ( 07 Sep 2020 15:14 )  x     / 0.80 ng/mL / x     / x     / x          < from: Transthoracic Echocardiogram (09.08.20 @ 09:44) >    Summary:   1.Normal global left ventricular systolic function.   2. LV Ejection Fraction by [place]'s Method with a biplane EF of 71 %.   3. Normal left atrial size.   4. Normal right atrial size.   5. There is no evidence of pericardial effusion.   6. Mild thickening of the anterior and posterior mitral valve leaflets.   7. No evidence of mitral valve regurgitation.    < end of copied text >    Case discussed with resident  Care discussed with pt    Impression/Plan  1. NSTEMI s/p MANASA of RCA  CCU monitoring  continue ASA/metoprolol/atorvastatin  Effient now started in place of Brilinta due to dyspnea  losartan started and monitor BP  check lipid panel  cardio following  GI/DVT prophylaxis    2. DM type 2 on insulin - FS acceptable    3. HTN on metoprolol and losartan    I reviewed the resident's note and I agree with the history, physical exam, assessment and plan as outlined with additions as above.     PROGRESS NOTE HANDOFF    Pending: lipid panel, OOB and ambulate, resolution of dyspnea    Disposition: home

## 2020-09-07 NOTE — ED PROVIDER NOTE - PROGRESS NOTE DETAILS
MQ: Trop 0.8, called cardiology, will come and see patient, still with pain Attending Note:   70 yo M PMH GERD, DM, diverticulosis here for abd pain. Pt says that yesterday after BBQ started having sharp, squeezing pain to mid abd associated with nausea. Non-radiating. No vomiting, fever, chills, or back pain. Pt was at nurse’s house who did his pulse and said it was in the 40s. Pt is still having pain. On exam: Tenderness to epigastric region. I&P: Epigastric abd pain. Bedside sono shows no gallstones. Will pursue CT. HR normal in ED. Will check EKG and troponin. Pt, however, has no CP or SOB. MQ: Cardiology will do cath, admit to tele GABRIELLE:   Pt p/w epigastric abd pain. Bedside sono WNL. Plan: CT abd mitch. Trop and EKG with signs of ischemia. Discussed with cardio and pt will be cathed this evening. Will cancel CT abd pelvis in light of anticipated bolus for cardiac cath

## 2020-09-07 NOTE — ED PROVIDER NOTE - OBJECTIVE STATEMENT
Patient is a 68 yo male with PMHx of DM on metformin, GERD, diverticulitis, and irregular heart rhythm on verapamil c/o epigastric abdominal pain since yesterday. Patient was at friend's house eating and at 4 PM, had sudden onset of epigastric abdominal pain, sharp, moderate, nonradiating, not worse with exertion, has been constant and still has now but improved. Took antacids without relief. Associated nausea, no vomiting. Denies fever, chills, chest pain, SOB, cough, abdominal pain, diarrhea, dysuria. Does not drink alcohol.

## 2020-09-08 LAB
A1C WITH ESTIMATED AVERAGE GLUCOSE RESULT: 7.1 % — HIGH (ref 4–5.6)
ALBUMIN SERPL ELPH-MCNC: 4.3 G/DL — SIGNIFICANT CHANGE UP (ref 3.5–5.2)
ALBUMIN SERPL ELPH-MCNC: 4.3 G/DL — SIGNIFICANT CHANGE UP (ref 3.5–5.2)
ALBUMIN SERPL ELPH-MCNC: 4.4 G/DL — SIGNIFICANT CHANGE UP (ref 3.5–5.2)
ALP SERPL-CCNC: 44 U/L — SIGNIFICANT CHANGE UP (ref 30–115)
ALP SERPL-CCNC: 45 U/L — SIGNIFICANT CHANGE UP (ref 30–115)
ALP SERPL-CCNC: 46 U/L — SIGNIFICANT CHANGE UP (ref 30–115)
ALT FLD-CCNC: 35 U/L — SIGNIFICANT CHANGE UP (ref 0–41)
ALT FLD-CCNC: 36 U/L — SIGNIFICANT CHANGE UP (ref 0–41)
ALT FLD-CCNC: 37 U/L — SIGNIFICANT CHANGE UP (ref 0–41)
ANION GAP SERPL CALC-SCNC: 13 MMOL/L — SIGNIFICANT CHANGE UP (ref 7–14)
ANION GAP SERPL CALC-SCNC: 13 MMOL/L — SIGNIFICANT CHANGE UP (ref 7–14)
ANION GAP SERPL CALC-SCNC: 14 MMOL/L — SIGNIFICANT CHANGE UP (ref 7–14)
APPEARANCE UR: CLEAR — SIGNIFICANT CHANGE UP
AST SERPL-CCNC: 152 U/L — HIGH (ref 0–41)
AST SERPL-CCNC: 182 U/L — HIGH (ref 0–41)
AST SERPL-CCNC: 188 U/L — HIGH (ref 0–41)
BASOPHILS # BLD AUTO: 0.01 K/UL — SIGNIFICANT CHANGE UP (ref 0–0.2)
BASOPHILS # BLD AUTO: 0.02 K/UL — SIGNIFICANT CHANGE UP (ref 0–0.2)
BASOPHILS NFR BLD AUTO: 0.1 % — SIGNIFICANT CHANGE UP (ref 0–1)
BASOPHILS NFR BLD AUTO: 0.3 % — SIGNIFICANT CHANGE UP (ref 0–1)
BILIRUB SERPL-MCNC: 0.9 MG/DL — SIGNIFICANT CHANGE UP (ref 0.2–1.2)
BILIRUB SERPL-MCNC: 1 MG/DL — SIGNIFICANT CHANGE UP (ref 0.2–1.2)
BILIRUB SERPL-MCNC: 1.3 MG/DL — HIGH (ref 0.2–1.2)
BILIRUB UR-MCNC: NEGATIVE — SIGNIFICANT CHANGE UP
BUN SERPL-MCNC: 11 MG/DL — SIGNIFICANT CHANGE UP (ref 10–20)
BUN SERPL-MCNC: 12 MG/DL — SIGNIFICANT CHANGE UP (ref 10–20)
BUN SERPL-MCNC: 15 MG/DL — SIGNIFICANT CHANGE UP (ref 10–20)
CALCIUM SERPL-MCNC: 8.7 MG/DL — SIGNIFICANT CHANGE UP (ref 8.5–10.1)
CALCIUM SERPL-MCNC: 8.7 MG/DL — SIGNIFICANT CHANGE UP (ref 8.5–10.1)
CALCIUM SERPL-MCNC: 8.8 MG/DL — SIGNIFICANT CHANGE UP (ref 8.5–10.1)
CHLORIDE SERPL-SCNC: 106 MMOL/L — SIGNIFICANT CHANGE UP (ref 98–110)
CHLORIDE SERPL-SCNC: 106 MMOL/L — SIGNIFICANT CHANGE UP (ref 98–110)
CHLORIDE SERPL-SCNC: 107 MMOL/L — SIGNIFICANT CHANGE UP (ref 98–110)
CK MB CFR SERPL CALC: 184.6 NG/ML — HIGH (ref 0.6–6.3)
CO2 SERPL-SCNC: 18 MMOL/L — SIGNIFICANT CHANGE UP (ref 17–32)
CO2 SERPL-SCNC: 18 MMOL/L — SIGNIFICANT CHANGE UP (ref 17–32)
CO2 SERPL-SCNC: 21 MMOL/L — SIGNIFICANT CHANGE UP (ref 17–32)
COLOR SPEC: SIGNIFICANT CHANGE UP
CREAT SERPL-MCNC: 0.8 MG/DL — SIGNIFICANT CHANGE UP (ref 0.7–1.5)
CREAT SERPL-MCNC: 0.8 MG/DL — SIGNIFICANT CHANGE UP (ref 0.7–1.5)
CREAT SERPL-MCNC: 0.9 MG/DL — SIGNIFICANT CHANGE UP (ref 0.7–1.5)
DIFF PNL FLD: NEGATIVE — SIGNIFICANT CHANGE UP
EOSINOPHIL # BLD AUTO: 0 K/UL — SIGNIFICANT CHANGE UP (ref 0–0.7)
EOSINOPHIL # BLD AUTO: 0 K/UL — SIGNIFICANT CHANGE UP (ref 0–0.7)
EOSINOPHIL NFR BLD AUTO: 0 % — SIGNIFICANT CHANGE UP (ref 0–8)
EOSINOPHIL NFR BLD AUTO: 0 % — SIGNIFICANT CHANGE UP (ref 0–8)
ESTIMATED AVERAGE GLUCOSE: 157 MG/DL — HIGH (ref 68–114)
GLUCOSE BLDC GLUCOMTR-MCNC: 144 MG/DL — HIGH (ref 70–99)
GLUCOSE BLDC GLUCOMTR-MCNC: 175 MG/DL — HIGH (ref 70–99)
GLUCOSE BLDC GLUCOMTR-MCNC: 184 MG/DL — HIGH (ref 70–99)
GLUCOSE BLDC GLUCOMTR-MCNC: 191 MG/DL — HIGH (ref 70–99)
GLUCOSE BLDC GLUCOMTR-MCNC: 266 MG/DL — HIGH (ref 70–99)
GLUCOSE SERPL-MCNC: 192 MG/DL — HIGH (ref 70–99)
GLUCOSE SERPL-MCNC: 198 MG/DL — HIGH (ref 70–99)
GLUCOSE SERPL-MCNC: 218 MG/DL — HIGH (ref 70–99)
GLUCOSE UR QL: ABNORMAL
HCT VFR BLD CALC: 38.8 % — LOW (ref 42–52)
HCT VFR BLD CALC: 41.2 % — LOW (ref 42–52)
HCV AB S/CO SERPL IA: 0.04 COI — SIGNIFICANT CHANGE UP
HCV AB SERPL-IMP: SIGNIFICANT CHANGE UP
HGB BLD-MCNC: 13 G/DL — LOW (ref 14–18)
HGB BLD-MCNC: 13.7 G/DL — LOW (ref 14–18)
IMM GRANULOCYTES NFR BLD AUTO: 0.1 % — SIGNIFICANT CHANGE UP (ref 0.1–0.3)
IMM GRANULOCYTES NFR BLD AUTO: 0.3 % — SIGNIFICANT CHANGE UP (ref 0.1–0.3)
KETONES UR-MCNC: ABNORMAL
LEUKOCYTE ESTERASE UR-ACNC: NEGATIVE — SIGNIFICANT CHANGE UP
LYMPHOCYTES # BLD AUTO: 0.71 K/UL — LOW (ref 1.2–3.4)
LYMPHOCYTES # BLD AUTO: 0.92 K/UL — LOW (ref 1.2–3.4)
LYMPHOCYTES # BLD AUTO: 10.1 % — LOW (ref 20.5–51.1)
LYMPHOCYTES # BLD AUTO: 12.6 % — LOW (ref 20.5–51.1)
MAGNESIUM SERPL-MCNC: 2.1 MG/DL — SIGNIFICANT CHANGE UP (ref 1.8–2.4)
MCHC RBC-ENTMCNC: 32.7 PG — HIGH (ref 27–31)
MCHC RBC-ENTMCNC: 32.7 PG — HIGH (ref 27–31)
MCHC RBC-ENTMCNC: 33.3 G/DL — SIGNIFICANT CHANGE UP (ref 32–37)
MCHC RBC-ENTMCNC: 33.5 G/DL — SIGNIFICANT CHANGE UP (ref 32–37)
MCV RBC AUTO: 97.5 FL — HIGH (ref 80–94)
MCV RBC AUTO: 98.3 FL — HIGH (ref 80–94)
MONOCYTES # BLD AUTO: 0.67 K/UL — HIGH (ref 0.1–0.6)
MONOCYTES # BLD AUTO: 0.76 K/UL — HIGH (ref 0.1–0.6)
MONOCYTES NFR BLD AUTO: 10.8 % — HIGH (ref 1.7–9.3)
MONOCYTES NFR BLD AUTO: 9.2 % — SIGNIFICANT CHANGE UP (ref 1.7–9.3)
NEUTROPHILS # BLD AUTO: 5.54 K/UL — SIGNIFICANT CHANGE UP (ref 1.4–6.5)
NEUTROPHILS # BLD AUTO: 5.69 K/UL — SIGNIFICANT CHANGE UP (ref 1.4–6.5)
NEUTROPHILS NFR BLD AUTO: 77.8 % — HIGH (ref 42.2–75.2)
NEUTROPHILS NFR BLD AUTO: 78.7 % — HIGH (ref 42.2–75.2)
NITRITE UR-MCNC: NEGATIVE — SIGNIFICANT CHANGE UP
NRBC # BLD: 0 /100 WBCS — SIGNIFICANT CHANGE UP (ref 0–0)
NRBC # BLD: 0 /100 WBCS — SIGNIFICANT CHANGE UP (ref 0–0)
PH UR: 6 — SIGNIFICANT CHANGE UP (ref 5–8)
PLATELET # BLD AUTO: 161 K/UL — SIGNIFICANT CHANGE UP (ref 130–400)
PLATELET # BLD AUTO: 171 K/UL — SIGNIFICANT CHANGE UP (ref 130–400)
POTASSIUM SERPL-MCNC: 4 MMOL/L — SIGNIFICANT CHANGE UP (ref 3.5–5)
POTASSIUM SERPL-MCNC: 4.6 MMOL/L — SIGNIFICANT CHANGE UP (ref 3.5–5)
POTASSIUM SERPL-MCNC: 5.1 MMOL/L — HIGH (ref 3.5–5)
POTASSIUM SERPL-SCNC: 4 MMOL/L — SIGNIFICANT CHANGE UP (ref 3.5–5)
POTASSIUM SERPL-SCNC: 4.6 MMOL/L — SIGNIFICANT CHANGE UP (ref 3.5–5)
POTASSIUM SERPL-SCNC: 5.1 MMOL/L — HIGH (ref 3.5–5)
PROT SERPL-MCNC: 6.1 G/DL — SIGNIFICANT CHANGE UP (ref 6–8)
PROT SERPL-MCNC: 6.4 G/DL — SIGNIFICANT CHANGE UP (ref 6–8)
PROT SERPL-MCNC: 6.4 G/DL — SIGNIFICANT CHANGE UP (ref 6–8)
PROT UR-MCNC: NEGATIVE — SIGNIFICANT CHANGE UP
RBC # BLD: 3.98 M/UL — LOW (ref 4.7–6.1)
RBC # BLD: 4.19 M/UL — LOW (ref 4.7–6.1)
RBC # FLD: 11.2 % — LOW (ref 11.5–14.5)
RBC # FLD: 11.4 % — LOW (ref 11.5–14.5)
SARS-COV-2 IGG SERPL QL IA: NEGATIVE — SIGNIFICANT CHANGE UP
SARS-COV-2 IGM SERPL IA-ACNC: <3.8 AU/ML — SIGNIFICANT CHANGE UP
SODIUM SERPL-SCNC: 137 MMOL/L — SIGNIFICANT CHANGE UP (ref 135–146)
SODIUM SERPL-SCNC: 139 MMOL/L — SIGNIFICANT CHANGE UP (ref 135–146)
SODIUM SERPL-SCNC: 140 MMOL/L — SIGNIFICANT CHANGE UP (ref 135–146)
SP GR SPEC: 1.03 — HIGH (ref 1.01–1.02)
TROPONIN T SERPL-MCNC: 1.84 NG/ML — CRITICAL HIGH
TROPONIN T SERPL-MCNC: 2.38 NG/ML — CRITICAL HIGH
UROBILINOGEN FLD QL: SIGNIFICANT CHANGE UP
WBC # BLD: 7.04 K/UL — SIGNIFICANT CHANGE UP (ref 4.8–10.8)
WBC # BLD: 7.31 K/UL — SIGNIFICANT CHANGE UP (ref 4.8–10.8)
WBC # FLD AUTO: 7.04 K/UL — SIGNIFICANT CHANGE UP (ref 4.8–10.8)
WBC # FLD AUTO: 7.31 K/UL — SIGNIFICANT CHANGE UP (ref 4.8–10.8)

## 2020-09-08 PROCEDURE — 93306 TTE W/DOPPLER COMPLETE: CPT | Mod: 26

## 2020-09-08 PROCEDURE — 99223 1ST HOSP IP/OBS HIGH 75: CPT

## 2020-09-08 PROCEDURE — 93010 ELECTROCARDIOGRAM REPORT: CPT

## 2020-09-08 PROCEDURE — 99232 SBSQ HOSP IP/OBS MODERATE 35: CPT

## 2020-09-08 RX ORDER — PANTOPRAZOLE SODIUM 20 MG/1
40 TABLET, DELAYED RELEASE ORAL
Refills: 0 | Status: DISCONTINUED | OUTPATIENT
Start: 2020-09-08 | End: 2020-09-09

## 2020-09-08 RX ORDER — PRASUGREL 5 MG/1
30 TABLET, FILM COATED ORAL ONCE
Refills: 0 | Status: COMPLETED | OUTPATIENT
Start: 2020-09-08 | End: 2020-09-08

## 2020-09-08 RX ORDER — INSULIN LISPRO 100/ML
VIAL (ML) SUBCUTANEOUS
Refills: 0 | Status: DISCONTINUED | OUTPATIENT
Start: 2020-09-08 | End: 2020-09-09

## 2020-09-08 RX ORDER — METFORMIN HYDROCHLORIDE 850 MG/1
1 TABLET ORAL
Qty: 0 | Refills: 0 | DISCHARGE

## 2020-09-08 RX ORDER — PRASUGREL 5 MG/1
1 TABLET, FILM COATED ORAL
Qty: 30 | Refills: 0
Start: 2020-09-08 | End: 2020-10-07

## 2020-09-08 RX ORDER — INSULIN GLARGINE 100 [IU]/ML
16 INJECTION, SOLUTION SUBCUTANEOUS AT BEDTIME
Refills: 0 | Status: DISCONTINUED | OUTPATIENT
Start: 2020-09-08 | End: 2020-09-09

## 2020-09-08 RX ORDER — PRASUGREL 5 MG/1
10 TABLET, FILM COATED ORAL DAILY
Refills: 0 | Status: DISCONTINUED | OUTPATIENT
Start: 2020-09-09 | End: 2020-09-09

## 2020-09-08 RX ADMIN — INSULIN GLARGINE 16 UNIT(S): 100 INJECTION, SOLUTION SUBCUTANEOUS at 21:18

## 2020-09-08 RX ADMIN — Medication 81 MILLIGRAM(S): at 11:47

## 2020-09-08 RX ADMIN — Medication 4 UNIT(S): at 12:00

## 2020-09-08 RX ADMIN — Medication 4 UNIT(S): at 16:59

## 2020-09-08 RX ADMIN — PANTOPRAZOLE SODIUM 40 MILLIGRAM(S): 20 TABLET, DELAYED RELEASE ORAL at 06:00

## 2020-09-08 RX ADMIN — PRASUGREL 30 MILLIGRAM(S): 5 TABLET, FILM COATED ORAL at 11:47

## 2020-09-08 RX ADMIN — Medication 1: at 16:59

## 2020-09-08 RX ADMIN — ATORVASTATIN CALCIUM 80 MILLIGRAM(S): 80 TABLET, FILM COATED ORAL at 21:19

## 2020-09-08 RX ADMIN — Medication 25 MILLIGRAM(S): at 08:25

## 2020-09-08 RX ADMIN — INSULIN GLARGINE 14 UNIT(S): 100 INJECTION, SOLUTION SUBCUTANEOUS at 00:16

## 2020-09-08 RX ADMIN — TICAGRELOR 90 MILLIGRAM(S): 90 TABLET ORAL at 06:00

## 2020-09-08 RX ADMIN — CHLORHEXIDINE GLUCONATE 1 APPLICATION(S): 213 SOLUTION TOPICAL at 05:08

## 2020-09-08 RX ADMIN — Medication 4 UNIT(S): at 08:25

## 2020-09-08 RX ADMIN — Medication 25 MILLIGRAM(S): at 18:22

## 2020-09-08 RX ADMIN — ENOXAPARIN SODIUM 40 MILLIGRAM(S): 100 INJECTION SUBCUTANEOUS at 11:48

## 2020-09-08 NOTE — PROGRESS NOTE ADULT - SUBJECTIVE AND OBJECTIVE BOX
Cardiology Follow up    NAHID SAM   69y Male  PAST MEDICAL & SURGICAL HISTORY:  Diverticulosis  GERD (gastroesophageal reflux disease)  Diabetes  No significant past surgical history     HPI:  69 y.o male patient with PMH of DM, HTN, "irregular heart beat", GERD, diverticulosis presented for non-resolving epigastric pain.  History goes back to the day prior to presentation at 4pm when the patient had barbecue. Following the meal, he suddenly felt weak and started sweating profusely, associated with epigastric pain. Episode lasted for about 30 minutes but the pain remained the same. He attributed to his regular indigestion and heartburn but the pain didn't resolve with pepcid and lasted the whole night. For this reason, he decided to present to the ED.  Patient describes the pain as sharp and pressure-like pain, epigastric, non-radiating, with no alleviating or relieving factors, associated with nausea. He denies fever, chills, melena, bright red blood per rectum or any other significant symptoms (07 Sep 2020 20:53)    Allergies    sulfa drugs (Rash)    Intolerances    Patient seen and examined at bedside. No acute events overnight.  Patient reported episodes of dyspnea - due to Brilinta.  Pt ambulated without issues/symptoms  Denies CP, SOB, palpitations, or dizziness  No events on telemetry overnight    Vital Signs Last 24 Hrs  T(C): 37.2 (08 Sep 2020 08:00), Max: 37.8 (08 Sep 2020 04:00)  T(F): 99 (08 Sep 2020 08:00), Max: 100.1 (08 Sep 2020 04:00)  HR: 64 (08 Sep 2020 08:00) (48 - 68)  BP: 125/58 (08 Sep 2020 08:00) (116/44 - 154/78)  BP(mean): 86 (08 Sep 2020 08:00) (60 - 104)  RR: 18 (08 Sep 2020 08:00) (16 - 21)  SpO2: 97% (08 Sep 2020 08:00) (97% - 100%)    MEDICATIONS  (STANDING):  aspirin  chewable 81 milliGRAM(s) Chew daily  atorvastatin 80 milliGRAM(s) Oral at bedtime  chlorhexidine 4% Liquid 1 Application(s) Topical <User Schedule>  dextrose 5%. 1000 milliLiter(s) (50 mL/Hr) IV Continuous <Continuous>  dextrose 50% Injectable 12.5 Gram(s) IV Push once  dextrose 50% Injectable 25 Gram(s) IV Push once  dextrose 50% Injectable 25 Gram(s) IV Push once  enoxaparin Injectable 40 milliGRAM(s) SubCutaneous daily  influenza   Vaccine 0.5 milliLiter(s) IntraMuscular once  insulin glargine Injectable (LANTUS) 14 Unit(s) SubCutaneous at bedtime  insulin lispro (HumaLOG) corrective regimen sliding scale   SubCutaneous at bedtime  insulin lispro Injectable (HumaLOG) 4 Unit(s) SubCutaneous three times a day before meals  losartan 50 milliGRAM(s) Oral daily  metoprolol tartrate 25 milliGRAM(s) Oral two times a day  pantoprazole    Tablet 40 milliGRAM(s) Oral before breakfast    MEDICATIONS  (PRN):  dextrose 40% Gel 15 Gram(s) Oral once PRN Blood Glucose LESS THAN 70 milliGRAM(s)/deciliter  glucagon  Injectable 1 milliGRAM(s) IntraMuscular once PRN Glucose LESS THAN 70 milligrams/deciliter    REVIEW OF SYSTEMS:          All negative except as mentioned in HPI    PHYSICAL EXAM:           CONSTITUTIONAL: Well-developed; well-nourished; in no acute distress  	SKIN: warm, dry  	HEAD: Normocephalic; atraumatic  	EYES: PERRL.  	ENT: No nasal discharge, airway clear, mucous membranes moist  	NECK: Supple; non tender.  	CARD: +S1, +S2, no murmurs, gallops, or rubs. Regular rate and rhythm    	RESP: No wheezes, rales or rhonchi. CTA B/L  	ABD: soft ntnd, + BS x 4 quadrants  	EXT: moves all extremities,  no clubbing, cyanosis or edema  	NEURO: Alert and oriented x3, no focal deficits          PSYCH: Cooperative, appropriate          VASCULAR:  + Rad / + PTs / +  DPs          EXTREMITY:             Right Radial: pressure dressing removed, access site soft, no hematoma, no pain, + pulses, no sign of infection, no numbness            ECG:   < from: 12 Lead ECG (09.08.20 @ 06:07) >  Ventricular Rate 56 BPM    Atrial Rate 56 BPM    P-R Interval 166 ms    QRS Duration 72 ms    Q-T Interval 436 ms    QTC Calculation(Bezet) 420 ms    P Axis 69 degrees    R Axis -45 degrees    T Axis 39 degrees    Diagnosis Line Sinus bradycardia with sinus arrhythmia  Left axis deviation  Possible Inferior infarct , age undetermined  Anterior infarct , age undetermined  Abnormal ECG    Confirmed by DOT OCASIO MD ([id number]) on 9/8/2020 7:46:49 AM                                                                                     2D ECHO:  < from: Transthoracic Echocardiogram (09.08.20 @ 09:44) >  Summary:   1.Normal global left ventricular systolic function.   2. LV Ejection Fraction by [place]'s Method with a biplane EF of 71 %.   3. Normal left atrial size.   4. Normal right atrial size.   5. There is no evidence of pericardial effusion.   6. Mild thickening of the anterior and posterior mitral valve leaflets.   7. No evidence of mitral valve regurgitation.    LABS:                        13.0   7.04  )-----------( 171      ( 08 Sep 2020 04:14 )             38.8     09-08    140  |  106  |  15  ----------------------------<  218<H>  4.0   |  21  |  0.9    Ca    8.7      08 Sep 2020 04:14  Mg     2.1     09-08    TPro  6.1  /  Alb  4.3  /  TBili  1.3<H>  /  DBili  x   /  AST  152<H>  /  ALT  35  /  AlkPhos  44  09-08    CARDIAC MARKERS ( 08 Sep 2020 04:14 )  x     / 2.38 ng/mL / x     / x     / x      CARDIAC MARKERS ( 08 Sep 2020 00:25 )  x     / 1.84 ng/mL / x     / x     / 184.6 ng/mL  CARDIAC MARKERS ( 07 Sep 2020 15:14 )  x     / 0.80 ng/mL / x     / x     / x        Magnesium, Serum: 2.1 mg/dL [1.8 - 2.4] (09-08-20 @ 04:14)  LIVER FUNCTIONS - ( 08 Sep 2020 04:14 )  Alb: 4.3 g/dL / Pro: 6.1 g/dL / ALK PHOS: 44 U/L / ALT: 35 U/L / AST: 152 U/L / GGT: x             A/P:  I discussed the case with Cardiologist Dr. Noel and recommend the following:    CAD, s/p IWMI, s/p PCI of RCA                      D/C Brilinta due to Dyspnea                    Give Effient 30 mg PO x1 now                   Start Effient 10 mg PO Daily on 9/9/2020                   Ambulate patient around the unit                    FS AC and HS, strict glucemic control                    F/U Lipid panel, A1C                   Hold Metformin x 48 hr after Cardiac Cath                   Continue DAPT ( Aspirin 81 mg PO Daily and Effient 10 mg PO Daily ), B-Blocker, Statin Therapy, ARB                   Patient pharmacy called in for Effient prescription and it is 0$ per month, medication available for a  today 9/8/2020                   Keep K = 4, Mg = 2                   Patient agreeing to take DAPT for at least one year or as directed by cardiologist                    Pt given instructions on importance of taking antiplatelet medication or risk acute stent thrombosis/death                   Post cath instructions, access site care and activity restrictions reviewed with patient                     Discussed with patient to return to hospital if experience chest pain, shortness breath, dizziness and site bleeding                   Aggressive risk factor modification, diet counseling, smoking cessation discussed with patient                       Monitor patient in CCU

## 2020-09-08 NOTE — PROGRESS NOTE ADULT - ASSESSMENT
#STEMI s/p cath   - ECG with inf/lateral ischemic changes prior to cath. EKG post cath was unchanged.   - elevated troponin  - Cath was done 9/7 with a MANASA placed on the RCA.   - started on Aspirin 81mg daily, Ticagrelor 90mg BID, metoprolol tartrate 25mg BID and HOLD verapamil, Losartan 50mg daily (to start on 9/7/2020 and hold if SBP<100 mmHg), switch to Atorvastatin 80mg at bedtime  - Continue NS at 75cc/hr   - fu cardiac enzymes in 4 hours and in AM  - fu EKG upon arrival to CCU and daily while in CCU  - fu 2d echo  - F/u with Dr. Bernard.    #DM  - Pateint is on metformin and januvia at home   - started on lantus and lispro with coverage  - monitor finger sticks       # GI PPx - Protonix   # DVT PPx - Lovenox 40 mg SubQ    # Activity -  (X) Increase as Tolerated  # Dispo -   Patient to be discharged when medically optimized.  # Code Status - (X) FULL #STEMI s/p cath   - ECG with inf/lateral ischemic changes prior to cath. EKG post cath was unchanged.   - elevated troponin  - Cath was done 9/7 with a MANASA placed on the RCA.   - c/w Aspirin 81mg daily, metoprolol tartrate 25mg BID, Atorvastatin 80mg at bedtime     - d/c Ticagrelor 90mg BID 2/2 dyspnea. Start Effient 10mg PO daily  - Continue NS at 75cc/hr   - fu cardiac enzymes in 4 hours and in AM  - fu EKG upon arrival to CCU and daily while in CCU  - fu 2d echo  - F/u with Dr. Bernard.    #DM  - Pateint is on metformin and januvia at home   - started on lantus and lispro with coverage  - monitor finger sticks       # GI PPx - Protonix   # DVT PPx - Lovenox 40 mg SubQ    # Activity -  (X) Increase as Tolerated  # Dispo -   Patient to be discharged when medically optimized.  # Code Status - (X) FULL

## 2020-09-08 NOTE — PROGRESS NOTE ADULT - SUBJECTIVE AND OBJECTIVE BOX
SUBJECTIVE:    Patient is a 69y old Male who presents with a chief complaint of NSTEMI (08 Sep 2020 10:29)    Currently admitted to medicine with the primary diagnosis of NSTEMI (non-ST elevated myocardial infarction)     Today is hospital day 1d. This morning he is resting comfortably in bed and reports no new issues or overnight events.     INTERVAL EVENTS:   no acute events overnight    PAST MEDICAL & SURGICAL HISTORY  Diverticulosis  GERD (gastroesophageal reflux disease)  Diabetes  No significant past surgical history      ALLERGIES:  sulfa drugs (Rash)    MEDICATIONS:  STANDING MEDICATIONS  aspirin  chewable 81 milliGRAM(s) Chew daily  atorvastatin 80 milliGRAM(s) Oral at bedtime  chlorhexidine 4% Liquid 1 Application(s) Topical <User Schedule>  dextrose 5%. 1000 milliLiter(s) IV Continuous <Continuous>  dextrose 50% Injectable 12.5 Gram(s) IV Push once  dextrose 50% Injectable 25 Gram(s) IV Push once  dextrose 50% Injectable 25 Gram(s) IV Push once  enoxaparin Injectable 40 milliGRAM(s) SubCutaneous daily  influenza   Vaccine 0.5 milliLiter(s) IntraMuscular once  insulin glargine Injectable (LANTUS) 14 Unit(s) SubCutaneous at bedtime  insulin lispro (HumaLOG) corrective regimen sliding scale   SubCutaneous at bedtime  insulin lispro Injectable (HumaLOG) 4 Unit(s) SubCutaneous three times a day before meals  losartan 50 milliGRAM(s) Oral daily  metoprolol tartrate 25 milliGRAM(s) Oral two times a day  pantoprazole    Tablet 40 milliGRAM(s) Oral before breakfast  prasugrel 30 milliGRAM(s) Oral once    PRN MEDICATIONS  dextrose 40% Gel 15 Gram(s) Oral once PRN  glucagon  Injectable 1 milliGRAM(s) IntraMuscular once PRN    VITALS:   T(F): 99.3  HR: 66  BP: 118/57  RR: 20  SpO2: 97%    LABS:                        13.0   7.04  )-----------( 171      ( 08 Sep 2020 04:14 )             38.8     09-08    140  |  106  |  15  ----------------------------<  218<H>  4.0   |  21  |  0.9    Ca    8.7      08 Sep 2020 04:14  Mg     2.1     09-08    TPro  6.1  /  Alb  4.3  /  TBili  1.3<H>  /  DBili  x   /  AST  152<H>  /  ALT  35  /  AlkPhos  44  09-08          Troponin T, Serum: 2.38 ng/mL <HH> (09-08-20 @ 04:14)  Troponin T, Serum: 1.84 ng/mL <HH> (09-08-20 @ 00:25)  Lactate, Blood: 2.8 mmol/L <H> (09-07-20 @ 15:14)  Troponin T, Serum: 0.80 ng/mL <HH> (09-07-20 @ 15:14)      CARDIAC MARKERS ( 08 Sep 2020 04:14 )  x     / 2.38 ng/mL / x     / x     / x      CARDIAC MARKERS ( 08 Sep 2020 00:25 )  x     / 1.84 ng/mL / x     / x     / 184.6 ng/mL  CARDIAC MARKERS ( 07 Sep 2020 15:14 )  x     / 0.80 ng/mL / x     / x     / x          RADIOLOGY:    PHYSICAL EXAM:  GEN: No acute distress  PULM/CHEST: Clear to auscultation bilaterally, no rales, rhonchi or wheezes   CVS: Regular rate and rhythm, S1-S2, no murmurs  ABD: Soft, non-tender, non-distended, +BS  EXT: No edema. Right radial dressing CDI  NEURO: AAOx3    [place] Catheter: SUBJECTIVE:    Patient is a 69y old Male who presents with a chief complaint of NSTEMI (08 Sep 2020 10:29)    Currently admitted to medicine with the primary diagnosis of NSTEMI (non-ST elevated myocardial infarction)     Today is hospital day 1d. This morning he is resting comfortably in bed and reports no new issues or overnight events.     INTERVAL EVENTS:   no new complaints    PAST MEDICAL & SURGICAL HISTORY  Diverticulosis  GERD (gastroesophageal reflux disease)  Diabetes  No significant past surgical history      ALLERGIES:  sulfa drugs (Rash)    MEDICATIONS:  STANDING MEDICATIONS  aspirin  chewable 81 milliGRAM(s) Chew daily  atorvastatin 80 milliGRAM(s) Oral at bedtime  chlorhexidine 4% Liquid 1 Application(s) Topical <User Schedule>  dextrose 5%. 1000 milliLiter(s) IV Continuous <Continuous>  dextrose 50% Injectable 12.5 Gram(s) IV Push once  dextrose 50% Injectable 25 Gram(s) IV Push once  dextrose 50% Injectable 25 Gram(s) IV Push once  enoxaparin Injectable 40 milliGRAM(s) SubCutaneous daily  influenza   Vaccine 0.5 milliLiter(s) IntraMuscular once  insulin glargine Injectable (LANTUS) 14 Unit(s) SubCutaneous at bedtime  insulin lispro (HumaLOG) corrective regimen sliding scale   SubCutaneous at bedtime  insulin lispro Injectable (HumaLOG) 4 Unit(s) SubCutaneous three times a day before meals  losartan 50 milliGRAM(s) Oral daily  metoprolol tartrate 25 milliGRAM(s) Oral two times a day  pantoprazole    Tablet 40 milliGRAM(s) Oral before breakfast  prasugrel 30 milliGRAM(s) Oral once    PRN MEDICATIONS  dextrose 40% Gel 15 Gram(s) Oral once PRN  glucagon  Injectable 1 milliGRAM(s) IntraMuscular once PRN    VITALS:   T(F): 99.3  HR: 66  BP: 118/57  RR: 20  SpO2: 97%    LABS:                        13.0   7.04  )-----------( 171      ( 08 Sep 2020 04:14 )             38.8     09-08    140  |  106  |  15  ----------------------------<  218<H>  4.0   |  21  |  0.9    Ca    8.7      08 Sep 2020 04:14  Mg     2.1     09-08    TPro  6.1  /  Alb  4.3  /  TBili  1.3<H>  /  DBili  x   /  AST  152<H>  /  ALT  35  /  AlkPhos  44  09-08          Troponin T, Serum: 2.38 ng/mL <HH> (09-08-20 @ 04:14)  Troponin T, Serum: 1.84 ng/mL <HH> (09-08-20 @ 00:25)  Lactate, Blood: 2.8 mmol/L <H> (09-07-20 @ 15:14)  Troponin T, Serum: 0.80 ng/mL <HH> (09-07-20 @ 15:14)      CARDIAC MARKERS ( 08 Sep 2020 04:14 )  x     / 2.38 ng/mL / x     / x     / x      CARDIAC MARKERS ( 08 Sep 2020 00:25 )  x     / 1.84 ng/mL / x     / x     / 184.6 ng/mL  CARDIAC MARKERS ( 07 Sep 2020 15:14 )  x     / 0.80 ng/mL / x     / x     / x          RADIOLOGY:    PHYSICAL EXAM:  GEN: No acute distress  PULM/CHEST: Clear to auscultation bilaterally, no rales, rhonchi or wheezes   CVS: Regular rate and rhythm, S1-S2, no murmurs  ABD: Soft, non-tender, non-distended, +BS  EXT: No edema. Right radial dressing CDI  NEURO: AAOx3    [place] Catheter:

## 2020-09-08 NOTE — PROGRESS NOTE ADULT - SUBJECTIVE AND OBJECTIVE BOX
Patient is a 69y old  Male who presents with a chief complaint of NSTEMI (07 Sep 2020 20:53)        SUBJ:  Patient seen and examined. No chest pain. + nausea. + dyspnea.      MEDICATIONS  (STANDING):  aspirin  chewable 81 milliGRAM(s) Chew daily  atorvastatin 80 milliGRAM(s) Oral at bedtime  chlorhexidine 4% Liquid 1 Application(s) Topical <User Schedule>  dextrose 5%. 1000 milliLiter(s) (50 mL/Hr) IV Continuous <Continuous>  dextrose 50% Injectable 12.5 Gram(s) IV Push once  dextrose 50% Injectable 25 Gram(s) IV Push once  dextrose 50% Injectable 25 Gram(s) IV Push once  enoxaparin Injectable 40 milliGRAM(s) SubCutaneous daily  influenza   Vaccine 0.5 milliLiter(s) IntraMuscular once  insulin glargine Injectable (LANTUS) 14 Unit(s) SubCutaneous at bedtime  insulin lispro (HumaLOG) corrective regimen sliding scale   SubCutaneous at bedtime  insulin lispro Injectable (HumaLOG) 4 Unit(s) SubCutaneous three times a day before meals  losartan 50 milliGRAM(s) Oral daily  metoprolol tartrate 25 milliGRAM(s) Oral two times a day  pantoprazole    Tablet 40 milliGRAM(s) Oral before breakfast  ticagrelor 90 milliGRAM(s) Oral every 12 hours    MEDICATIONS  (PRN):  dextrose 40% Gel 15 Gram(s) Oral once PRN Blood Glucose LESS THAN 70 milliGRAM(s)/deciliter  glucagon  Injectable 1 milliGRAM(s) IntraMuscular once PRN Glucose LESS THAN 70 milligrams/deciliter            Vital Signs Last 24 Hrs  T(C): 37.2 (08 Sep 2020 08:00), Max: 37.8 (08 Sep 2020 04:00)  T(F): 99 (08 Sep 2020 08:00), Max: 100.1 (08 Sep 2020 04:00)  HR: 64 (08 Sep 2020 08:00) (48 - 68)  BP: 125/58 (08 Sep 2020 08:00) (116/44 - 154/78)  BP(mean): 86 (08 Sep 2020 08:00) (60 - 104)  RR: 18 (08 Sep 2020 08:00) (16 - 21)  SpO2: 97% (08 Sep 2020 08:00) (97% - 100%)      PHYSICAL EXAM:    GEN: AAO x 3, NAD  HEENT: NC/AT, PERRL  Neck: No JVD, no bruits  CV: Reg, S1-S2, no murmur  Lungs: CTAB  Abd: Soft, non-tender  Ext: No edema      I&O's Summary    07 Sep 2020 07:01  -  08 Sep 2020 07:00  --------------------------------------------------------  IN: 875 mL / OUT: 1350 mL / NET: -475 mL    08 Sep 2020 07:01  -  08 Sep 2020 09:34  --------------------------------------------------------  IN: 100 mL / OUT: 0 mL / NET: 100 mL    	        ECG:  < from: 12 Lead ECG (09.08.20 @ 06:07) >  Sinus bradycardia with sinus arrhythmia  Left axis deviation  Possible Inferior infarct , age undetermined  Anterior infarct , age undetermined  Abnormal ECG    < end of copied text >    TTE:  Pending    LABS:                        13.0   7.04  )-----------( 171      ( 08 Sep 2020 04:14 )             38.8     09-08    140  |  106  |  15  ----------------------------<  218<H>  4.0   |  21  |  0.9    Ca    8.7      08 Sep 2020 04:14  Mg     2.1     09-08    TPro  6.1  /  Alb  4.3  /  TBili  1.3<H>  /  DBili  x   /  AST  152<H>  /  ALT  35  /  AlkPhos  44  09-08    CARDIAC MARKERS ( 08 Sep 2020 04:14 )  x     / 2.38 ng/mL / x     / x     / x      CARDIAC MARKERS ( 08 Sep 2020 00:25 )  x     / 1.84 ng/mL / x     / x     / 184.6 ng/mL  CARDIAC MARKERS ( 07 Sep 2020 15:14 )  x     / 0.80 ng/mL / x     / x     / x                BNP  RADIOLOGY & ADDITIONAL STUDIES:      IMPRESSION AND PLAN:

## 2020-09-08 NOTE — PROGRESS NOTE ADULT - ASSESSMENT
CAD, s/p IWMI, s/p PCI of RCA      Hemodynamically stable.  C/w DAPT, BB, ARB, statin  DM control.  Check HgA1c, Lipid panel.  2d echo today.  Ambulate.  if stable, will plan for d/c tomorrow.

## 2020-09-09 ENCOUNTER — TRANSCRIPTION ENCOUNTER (OUTPATIENT)
Age: 70
End: 2020-09-09

## 2020-09-09 VITALS
OXYGEN SATURATION: 96 % | RESPIRATION RATE: 18 BRPM | SYSTOLIC BLOOD PRESSURE: 112 MMHG | HEART RATE: 56 BPM | DIASTOLIC BLOOD PRESSURE: 65 MMHG

## 2020-09-09 LAB
ANION GAP SERPL CALC-SCNC: 10 MMOL/L — SIGNIFICANT CHANGE UP (ref 7–14)
BUN SERPL-MCNC: 16 MG/DL — SIGNIFICANT CHANGE UP (ref 10–20)
CALCIUM SERPL-MCNC: 8.4 MG/DL — LOW (ref 8.5–10.1)
CHLORIDE SERPL-SCNC: 109 MMOL/L — SIGNIFICANT CHANGE UP (ref 98–110)
CK MB CFR SERPL CALC: 13.6 NG/ML — HIGH (ref 0.6–6.3)
CK SERPL-CCNC: 662 U/L — HIGH (ref 0–225)
CO2 SERPL-SCNC: 21 MMOL/L — SIGNIFICANT CHANGE UP (ref 17–32)
CREAT SERPL-MCNC: 0.9 MG/DL — SIGNIFICANT CHANGE UP (ref 0.7–1.5)
GLUCOSE BLDC GLUCOMTR-MCNC: 151 MG/DL — HIGH (ref 70–99)
GLUCOSE BLDC GLUCOMTR-MCNC: 198 MG/DL — HIGH (ref 70–99)
GLUCOSE SERPL-MCNC: 141 MG/DL — HIGH (ref 70–99)
HCT VFR BLD CALC: 37 % — LOW (ref 42–52)
HGB BLD-MCNC: 12.5 G/DL — LOW (ref 14–18)
MAGNESIUM SERPL-MCNC: 2.1 MG/DL — SIGNIFICANT CHANGE UP (ref 1.8–2.4)
MCHC RBC-ENTMCNC: 33.1 PG — HIGH (ref 27–31)
MCHC RBC-ENTMCNC: 33.8 G/DL — SIGNIFICANT CHANGE UP (ref 32–37)
MCV RBC AUTO: 97.9 FL — HIGH (ref 80–94)
NRBC # BLD: 0 /100 WBCS — SIGNIFICANT CHANGE UP (ref 0–0)
PHOSPHATE SERPL-MCNC: 2.5 MG/DL — SIGNIFICANT CHANGE UP (ref 2.1–4.9)
PLATELET # BLD AUTO: 159 K/UL — SIGNIFICANT CHANGE UP (ref 130–400)
POTASSIUM SERPL-MCNC: 3.6 MMOL/L — SIGNIFICANT CHANGE UP (ref 3.5–5)
POTASSIUM SERPL-SCNC: 3.6 MMOL/L — SIGNIFICANT CHANGE UP (ref 3.5–5)
RBC # BLD: 3.78 M/UL — LOW (ref 4.7–6.1)
RBC # FLD: 11.2 % — LOW (ref 11.5–14.5)
SODIUM SERPL-SCNC: 140 MMOL/L — SIGNIFICANT CHANGE UP (ref 135–146)
TROPONIN T SERPL-MCNC: 1.78 NG/ML — CRITICAL HIGH
WBC # BLD: 5.62 K/UL — SIGNIFICANT CHANGE UP (ref 4.8–10.8)
WBC # FLD AUTO: 5.62 K/UL — SIGNIFICANT CHANGE UP (ref 4.8–10.8)

## 2020-09-09 PROCEDURE — 99232 SBSQ HOSP IP/OBS MODERATE 35: CPT

## 2020-09-09 PROCEDURE — 93010 ELECTROCARDIOGRAM REPORT: CPT

## 2020-09-09 PROCEDURE — 99239 HOSP IP/OBS DSCHRG MGMT >30: CPT

## 2020-09-09 RX ORDER — PRASUGREL 5 MG/1
1 TABLET, FILM COATED ORAL
Qty: 30 | Refills: 0
Start: 2020-09-09 | End: 2020-10-08

## 2020-09-09 RX ORDER — ASPIRIN/CALCIUM CARB/MAGNESIUM 324 MG
1 TABLET ORAL
Qty: 30 | Refills: 0
Start: 2020-09-09 | End: 2020-10-08

## 2020-09-09 RX ORDER — VERAPAMIL HCL 240 MG
1 CAPSULE, EXTENDED RELEASE PELLETS 24 HR ORAL
Qty: 0 | Refills: 0 | DISCHARGE

## 2020-09-09 RX ORDER — ATORVASTATIN CALCIUM 80 MG/1
1 TABLET, FILM COATED ORAL
Qty: 0 | Refills: 0 | DISCHARGE
Start: 2020-09-09

## 2020-09-09 RX ORDER — LOSARTAN POTASSIUM 100 MG/1
25 TABLET, FILM COATED ORAL DAILY
Refills: 0 | Status: DISCONTINUED | OUTPATIENT
Start: 2020-09-09 | End: 2020-09-09

## 2020-09-09 RX ORDER — METOPROLOL TARTRATE 50 MG
1 TABLET ORAL
Qty: 30 | Refills: 0
Start: 2020-09-09 | End: 2020-10-08

## 2020-09-09 RX ORDER — LOSARTAN POTASSIUM 100 MG/1
1 TABLET, FILM COATED ORAL
Qty: 0 | Refills: 0 | DISCHARGE
Start: 2020-09-09

## 2020-09-09 RX ORDER — METOPROLOL TARTRATE 50 MG
1 TABLET ORAL
Qty: 0 | Refills: 0 | DISCHARGE
Start: 2020-09-09

## 2020-09-09 RX ORDER — SIMVASTATIN 20 MG/1
1 TABLET, FILM COATED ORAL
Qty: 0 | Refills: 0 | DISCHARGE

## 2020-09-09 RX ORDER — ASPIRIN/CALCIUM CARB/MAGNESIUM 324 MG
1 TABLET ORAL
Qty: 0 | Refills: 0 | DISCHARGE

## 2020-09-09 RX ORDER — LOSARTAN POTASSIUM 100 MG/1
1 TABLET, FILM COATED ORAL
Qty: 30 | Refills: 0
Start: 2020-09-09 | End: 2020-10-08

## 2020-09-09 RX ORDER — LOSARTAN POTASSIUM 100 MG/1
1 TABLET, FILM COATED ORAL
Qty: 0 | Refills: 0 | DISCHARGE

## 2020-09-09 RX ORDER — ATORVASTATIN CALCIUM 80 MG/1
1 TABLET, FILM COATED ORAL
Qty: 30 | Refills: 0
Start: 2020-09-09 | End: 2020-10-08

## 2020-09-09 RX ORDER — METOPROLOL TARTRATE 50 MG
25 TABLET ORAL AT BEDTIME
Refills: 0 | Status: DISCONTINUED | OUTPATIENT
Start: 2020-09-09 | End: 2020-09-09

## 2020-09-09 RX ADMIN — Medication 4 UNIT(S): at 08:28

## 2020-09-09 RX ADMIN — INFLUENZA VIRUS VACCINE 0.5 MILLILITER(S): 15; 15; 15; 15 SUSPENSION INTRAMUSCULAR at 16:59

## 2020-09-09 RX ADMIN — Medication 1: at 08:28

## 2020-09-09 RX ADMIN — CHLORHEXIDINE GLUCONATE 1 APPLICATION(S): 213 SOLUTION TOPICAL at 05:03

## 2020-09-09 RX ADMIN — LOSARTAN POTASSIUM 50 MILLIGRAM(S): 100 TABLET, FILM COATED ORAL at 05:02

## 2020-09-09 RX ADMIN — PANTOPRAZOLE SODIUM 40 MILLIGRAM(S): 20 TABLET, DELAYED RELEASE ORAL at 06:04

## 2020-09-09 RX ADMIN — PRASUGREL 10 MILLIGRAM(S): 5 TABLET, FILM COATED ORAL at 12:15

## 2020-09-09 RX ADMIN — Medication 81 MILLIGRAM(S): at 12:15

## 2020-09-09 NOTE — PROGRESS NOTE ADULT - ASSESSMENT
patient seen and examined. D/w NP, CCU team.  Clinically improved.  Will decrease metoprolol to succinate 25 mg QD.  C/w ASA, Effient, statin.  D/c home today.  F/u with Dr. Bernard in 2 weeks.

## 2020-09-09 NOTE — PROGRESS NOTE ADULT - ASSESSMENT
#STEMI s/p cath   - ECG with inf/lateral ischemic changes prior to cath. EKG post cath was unchanged.   - elevated troponin 1.78 <- 2.38  - Cath was done 9/7 with a MANASA placed on the RCA.   - started on Aspirin 81mg daily, Ticagrelor 90mg BID, metoprolol tartrate 25mg BID and HOLD verapamil, Losartan 50mg daily  and hold if SBP<100, Atorvastatin 80mg at bedtime  - fu EKG upon arrival to CCU and daily while in CCU  - 2d echo: Normal EF, normal LV function   - F/u with Dr. Bernard.    #DM type 2  - -191  - Ha1c: 7.1%  - Pateint is on metformin and januvia at home   - C/w lantus and lispro with coverage  - monitor finger sticks     # GI PPx - Protonix   # DVT PPx - Lovenox 40 mg SubQ    # Activity -  (X) Increase as Tolerated  # Dispo -   Patient to be discharged when medically optimized.  # Code Status - (X) FULL      (X) Discussed Case and Plan with the Medical Attending. #STEMI s/p cath   - ECG with inf/lateral ischemic changes prior to cath. EKG post cath was unchanged.   - elevated troponin 1.78 <- 2.38  - Cath was done 9/7 with a MANASA placed on the RCA.   - Unable to tolerate Ticagrelor 90 mg BID due to sob  - C/w Aspirin 81mg daily, metoprolol tartrate 25mg BID and HOLD verapamil, Losartan 50mg daily  and hold if SBP<100, Atorvastatin 80mg at bedtime  - fu EKG upon arrival to CCU and daily while in CCU  - 2d echo: Normal EF, normal LV function   - F/u with Dr. Bernard.    #DM type 2  - -191  - Ha1c: 7.1%  - Pateint is on metformin and januvia at home   - C/w lantus and lispro with coverage  - monitor finger sticks     # GI PPx - Protonix   # DVT PPx - Lovenox 40 mg SubQ    # Activity -  (X) Increase as Tolerated  # Dispo -   Patient to be discharged when medically optimized.  # Code Status - (X) FULL      (X) Discussed Case and Plan with the Medical Attending. #STEMI s/p cath   - ECG with inf/lateral ischemic changes prior to cath. EKG post cath was unchanged.   - elevated troponin 1.78 <- 2.38  - Cath was done 9/7 with a MANASA placed on the RCA.   - Unable to tolerate Ticagrelor 90 mg BID due to sob and Metoprolol tartrate 25mg PO BID due to bradycardia to the 40s overnight.  - C/w Aspirin 81mg daily, Toprol 25mg PO QHS, Losartan 50mg daily (hold if SBP<100), Atorvastatin 80mg PO QHS.  - C/w Prasugrel 10 mg PO QD   - D/c Verapamil   - 2d echo: Normal EF, normal LV function   - F/u with Dr. Bernard.    #DM type 2  - -191  - Ha1c: 7.1%  - Pateint is on metformin and januvia at home   - C/w lantus and lispro with coverage  - monitor finger sticks     # GI PPx - Protonix   # DVT PPx - Lovenox 40 mg SubQ    # Activity -  (X) Increase as Tolerated  # Dispo -   Patient to be discharged home today   # Code Status - (X) FULL      (X) Discussed Case and Plan with the Medical Attending.

## 2020-09-09 NOTE — DISCHARGE NOTE PROVIDER - HOSPITAL COURSE
69 y.o male patient with PMH of DM, HTN, "irregular heart beat", GERD, diverticulosis presented for non-resolving epigastric pain.    History goes back to the day prior to presentation at 4pm when the patient had barbecue. Following the meal, he suddenly felt weak and started sweating profusely, associated with epigastric pain. Episode lasted for about 30 minutes but the pain remained the same. He attributed to his regular indigestion and heartburn but the pain didn't resolve with pepcid and lasted the whole night. For this reason, he decided to present to the ED.        Patient describes the pain as sharp and pressure-like pain, epigastric, non-radiating, with no alleviating or relieving factors, associated with nausea. He denies fever, chills, melena, bright red blood per rectum or any other significant symptoms.     ECG c/with inf/lateral ischemic changes prior to cath. EKG post cath was unchanged. Trop ECG with inf/lateral ischemic changes prior to cath. EKG post cath was unchanged. 2d echo: Normal EF, normal LV function         Pt was seen by cardiology for STEMI and underwent cath on 9/7 with a MANASA placed on the RCA. Post cath EKG was unchanged.         Pt was unable to tolerate Ticagrelor 90 mg BID due to sob and Metoprolol tartrate 25mg PO BID due to bradycardia to the 40s overnight. Pt is stable to be d/c home to day on Aspirin 81mg daily, Toprol 25mg PO QHS, Losartan 50mg daily, Atorvastatin 80mg PO QHS and Prasugrel 10 mg PO QD with outpt f/u Dr. Bernard. D/c Verapamil         FS remained controlled. Pt to resume metformin 9/10 69 y.o male patient with PMH of DM, HTN, "irregular heart beat", GERD, diverticulosis presented for non-resolving epigastric pain.    History goes back to the day prior to presentation at 4pm when the patient had barbecue. Following the meal, he suddenly felt weak and started sweating profusely, associated with epigastric pain. Episode lasted for about 30 minutes but the pain remained the same. He attributed to his regular indigestion and heartburn but the pain didn't resolve with pepcid and lasted the whole night. For this reason, he decided to present to the ED.        Patient describes the pain as sharp and pressure-like pain, epigastric, non-radiating, with no alleviating or relieving factors, associated with nausea. He denies fever, chills, melena, bright red blood per rectum or any other significant symptoms.     ECG c/with inf/lateral ischemic changes prior to cath. + elevated troponins 2d echo: Normal EF, normal LV function         Pt was seen by cardiology for NSTEMI and underwent cath on 9/7 with a MANASA placed on the RCA. Post cath EKG was unchanged.         Pt was unable to tolerate Ticagrelor 90 mg BID due to sob and Metoprolol tartrate 25mg PO BID due to bradycardia to the 40s overnight. Pt is stable to be d/c home to day on Aspirin 81mg daily, Toprol 25mg PO QHS, Losartan 50mg daily, Atorvastatin 80mg PO QHS and Prasugrel 10 mg PO QD with outpt f/u Dr. Bernard. D/c Verapamil         FS remained controlled. Pt to resume metformin 9/10 69 y.o male patient with PMH of DM, HTN, "irregular heart beat", GERD, diverticulosis presented for non-resolving epigastric pain.    History goes back to the day prior to presentation at 4pm when the patient had barbecue. Following the meal, he suddenly felt weak and started sweating profusely, associated with epigastric pain. Episode lasted for about 30 minutes but the pain remained the same. He attributed to his regular indigestion and heartburn but the pain didn't resolve with pepcid and lasted the whole night. For this reason, he decided to present to the ED.        Patient describes the pain as sharp and pressure-like pain, epigastric, non-radiating, with no alleviating or relieving factors, associated with nausea. He denies fever, chills, melena, bright red blood per rectum or any other significant symptoms.     ECG c/with inf/lateral ischemic changes prior to cath. + elevated troponins 2d echo: Normal EF, normal LV function         Pt was seen by cardiology for NSTEMI and underwent cath on 9/7 with a MANASA placed on the RCA. Post cath EKG was unchanged.         Pt was unable to tolerate Ticagrelor 90 mg BID due to sob and Metoprolol tartrate 25mg PO BID due to bradycardia to the 40s overnight. Pt is stable to be d/c home to day on Aspirin 81mg daily, Toprol 25mg PO QHS, Losartan 25mg daily, Atorvastatin 80mg PO QHS and Prasugrel 10 mg PO QD with outpt f/u Dr. Bernard. D/c Verapamil         FS remained controlled. Pt to resume metformin 9/10

## 2020-09-09 NOTE — PROGRESS NOTE ADULT - ATTENDING COMMENTS
Pt seen and examined independently today. He is lying in bed and feels weak and tired.  HR in the 40's overnight.  No chest pain or SOB.    and HR now in the 60's.  metoprolol dose adjusted to once a day  stop losartan if SBP < 100   pt encouraged to eat lunch and he needs to ambulate in the unit this afternoon.  Cardiology f/u appreciated  If he feels better, ambulates in the unit and HR and BP are improved, then he may be discharged home later today with outpt cardio f/u and f/u with PMD.  he will resume metformin on 7/10    will review medication reconciliation and discharge papers    I discussed the case with the resident and I reviewed her note.  Agree with the physical exam, assessment and plan with additions as above.    Spent 40 minutes on the discharge process of this pt

## 2020-09-09 NOTE — DISCHARGE NOTE PROVIDER - CARE PROVIDER_API CALL
Edmond Bernard)  Cardiovascular Disease; Internal Medicine  [address] [place] [address], [address]  [place] [address]  Phone: [phone]  Fax: [phone]  Follow Up Time:     Terence Varma  Internal Medicine  [address] [facility], [address]  [place] [address]  Phone: [phone]  Fax: [phone]  Follow Up Time:

## 2020-09-09 NOTE — PROGRESS NOTE ADULT - SUBJECTIVE AND OBJECTIVE BOX
Hospital Day:  2d    Subjective:    Patient is a 69y old  Male who presents with a chief complaint of NSTEMI (08 Sep 2020 11:10)      Admitted to medicine for a primary diagnosis of     Past Medical Hx:   Diverticulosis  GERD (gastroesophageal reflux disease)  Diabetes    Past Sx:  No significant past surgical history    Allergies:  sulfa drugs (Rash)    Current Meds:   Standng Meds:  aspirin  chewable 81 milliGRAM(s) Chew daily  atorvastatin 80 milliGRAM(s) Oral at bedtime  chlorhexidine 4% Liquid 1 Application(s) Topical <User Schedule>  dextrose 5%. 1000 milliLiter(s) (50 mL/Hr) IV Continuous <Continuous>  dextrose 50% Injectable 12.5 Gram(s) IV Push once  dextrose 50% Injectable 25 Gram(s) IV Push once  dextrose 50% Injectable 25 Gram(s) IV Push once  enoxaparin Injectable 40 milliGRAM(s) SubCutaneous daily  influenza   Vaccine 0.5 milliLiter(s) IntraMuscular once  insulin glargine Injectable (LANTUS) 16 Unit(s) SubCutaneous at bedtime  insulin lispro (HumaLOG) corrective regimen sliding scale   SubCutaneous three times a day before meals  insulin lispro Injectable (HumaLOG) 4 Unit(s) SubCutaneous three times a day before meals  losartan 50 milliGRAM(s) Oral daily  metoprolol tartrate 25 milliGRAM(s) Oral two times a day  pantoprazole    Tablet 40 milliGRAM(s) Oral before breakfast  prasugrel 10 milliGRAM(s) Oral daily    PRN Meds:  dextrose 40% Gel 15 Gram(s) Oral once PRN Blood Glucose LESS THAN 70 milliGRAM(s)/deciliter  glucagon  Injectable 1 milliGRAM(s) IntraMuscular once PRN Glucose LESS THAN 70 milligrams/deciliter    HOME MEDICATIONS:  aspirin 81 mg oral tablet, chewable: 1 tab(s) orally once a day  Januvia 100 mg oral tablet: 1 tab(s) orally once a day  Levsin SL 0.125 mg sublingual tablet: 1 tab(s) sublingual every 4 hours, As Needed  metFORMIN 1000 mg oral tablet: 1 tab(s) orally 2 times a day  HOLD METFORMIN X 48 HR AFTER CARDIAC CATH  pantoprazole 40 mg oral delayed release tablet: 1 tab(s) orally once a day  simvastatin 20 mg oral tablet: 1 tab(s) orally once a day (at bedtime)  verapamil 120 mg/24 hours oral capsule, extended release: 1 cap(s) orally once a day      Vital Signs:   T(F): 98.6 (20 @ 04:00), Max: 99.6 (20 @ 00:00)  HR: 50 (20 @ 06:00) (49 - 66)  BP: 102/52 (20 @ 06:00) (101/50 - 129/68)  RR: 20 (20 @ 00:00) (18 - 20)  SpO2: 98% (20 @ 06:00) (97% - 99%)      20 @ 07:01  -  20 @ 07:00  --------------------------------------------------------  IN: 875 mL / OUT: 1350 mL / NET: -475 mL    20 @ 07:01  -  20 @ 06:55  --------------------------------------------------------  IN: 425 mL / OUT: 960 mL / NET: -535 mL        Physical Exam:   GENERAL: NAD  HEENT: NCAT  CHEST/LUNG: CTAB  HEART: Regular rate and rhythm; s1 s2 appreciated, No murmurs, rubs, or gallops  ABDOMEN: Soft, Nontender, Nondistended; Bowel sounds present  EXTREMITIES: No LE edema b/l  SKIN: no rashes, no new lesions  NERVOUS SYSTEM:  Alert & Oriented X3  LINES/CATHETERS:        Labs:                         12.5   5.62  )-----------( 159      ( 09 Sep 2020 04:27 )             37.0       09 Sep 2020 04:27    140    |  109    |  16     ----------------------------<  141    3.6     |  21     |  0.9      Ca    8.4        09 Sep 2020 04:27  Phos  2.5       09 Sep 2020 04:27  Mg     2.1       09 Sep 2020 04:27    TPro  6.1    /  Alb  4.3    /  TBili  1.3    /  DBili  x      /  AST  152    /  ALT  35     /  AlkPhos  44     08 Sep 2020 04:14        Amylase --, Lipase 21, 20 @ 15:14        Troponin 1.78, CKMB 13.6,  20 @ 04:27  Troponin 2.38, CKMB --, CK -- 20 @ 04:14  Troponin 1.84, CKMB 184.6, CK -- 20 @ 00:25  Troponin 0.80, CKMB --, CK -- 20 @ 15:14        Urinalysis Basic - ( 08 Sep 2020 12:00 )    Color: Light Yellow / Appearance: Clear / S.029 / pH: x  Gluc: x / Ketone: Small  / Bili: Negative / Urobili: <2 mg/dL   Blood: x / Protein: Negative / Nitrite: Negative   Leuk Esterase: Negative / RBC: x / WBC x   Sq Epi: x / Non Sq Epi: x / Bacteria: x Hospital Day:  2d    Subjective:    Patient is a 69y old  Male who presents with a chief complaint of NSTEMI. No acute events overnight. Denies c/p this am.       Admitted to medicine for a primary diagnosis of NSTEMI    Past Medical Hx:   Diverticulosis  GERD (gastroesophageal reflux disease)  Diabetes    Past Sx:  No significant past surgical history    Allergies:  sulfa drugs (Rash)    Current Meds:   Standng Meds:  aspirin  chewable 81 milliGRAM(s) Chew daily  atorvastatin 80 milliGRAM(s) Oral at bedtime  chlorhexidine 4% Liquid 1 Application(s) Topical <User Schedule>  dextrose 5%. 1000 milliLiter(s) (50 mL/Hr) IV Continuous <Continuous>  dextrose 50% Injectable 12.5 Gram(s) IV Push once  dextrose 50% Injectable 25 Gram(s) IV Push once  dextrose 50% Injectable 25 Gram(s) IV Push once  enoxaparin Injectable 40 milliGRAM(s) SubCutaneous daily  influenza   Vaccine 0.5 milliLiter(s) IntraMuscular once  insulin glargine Injectable (LANTUS) 16 Unit(s) SubCutaneous at bedtime  insulin lispro (HumaLOG) corrective regimen sliding scale   SubCutaneous three times a day before meals  insulin lispro Injectable (HumaLOG) 4 Unit(s) SubCutaneous three times a day before meals  losartan 50 milliGRAM(s) Oral daily  metoprolol tartrate 25 milliGRAM(s) Oral two times a day  pantoprazole    Tablet 40 milliGRAM(s) Oral before breakfast  prasugrel 10 milliGRAM(s) Oral daily    PRN Meds:  dextrose 40% Gel 15 Gram(s) Oral once PRN Blood Glucose LESS THAN 70 milliGRAM(s)/deciliter  glucagon  Injectable 1 milliGRAM(s) IntraMuscular once PRN Glucose LESS THAN 70 milligrams/deciliter    HOME MEDICATIONS:  aspirin 81 mg oral tablet, chewable: 1 tab(s) orally once a day  Januvia 100 mg oral tablet: 1 tab(s) orally once a day  Levsin SL 0.125 mg sublingual tablet: 1 tab(s) sublingual every 4 hours, As Needed  metFORMIN 1000 mg oral tablet: 1 tab(s) orally 2 times a day  HOLD METFORMIN X 48 HR AFTER CARDIAC CATH  pantoprazole 40 mg oral delayed release tablet: 1 tab(s) orally once a day  simvastatin 20 mg oral tablet: 1 tab(s) orally once a day (at bedtime)  verapamil 120 mg/24 hours oral capsule, extended release: 1 cap(s) orally once a day      Vital Signs:   T(F): 98.6 (20 @ 04:00), Max: 99.6 (20 @ 00:00)  HR: 50 (20 @ 06:00) (49 - 66)  BP: 102/52 (20 @ 06:00) (101/50 - 129/68)  RR: 20 (20 @ 00:00) (18 - 20)  SpO2: 98% (20 @ 06:00) (97% - 99%)      20 @ 07:01  -  20 @ 07:00  --------------------------------------------------------  IN: 875 mL / OUT: 1350 mL / NET: -475 mL    20 @ 07:01  -  20 @ 06:55  --------------------------------------------------------  IN: 425 mL / OUT: 960 mL / NET: -535 mL        Physical Exam:   GENERAL: NAD  HEENT: NCAT  CHEST/LUNG: CTAB  HEART: Regular rate and rhythm; s1 s2 appreciated, No murmurs, rubs, or gallops  ABDOMEN: Soft, Nontender, Nondistended; Bowel sounds present  EXTREMITIES: No LE edema b/l  SKIN: no rashes, no new lesions  NERVOUS SYSTEM:  Alert & Oriented X3  LINES/CATHETERS: Peripheral IV        Labs:                         12.5   5.62  )-----------( 159      ( 09 Sep 2020 04:27 )             37.0       09 Sep 2020 04:27    140    |  109    |  16     ----------------------------<  141    3.6     |  21     |  0.9      Ca    8.4        09 Sep 2020 04:27  Phos  2.5       09 Sep 2020 04:27  Mg     2.1       09 Sep 2020 04:27    TPro  6.1    /  Alb  4.3    /  TBili  1.3    /  DBili  x      /  AST  152    /  ALT  35     /  AlkPhos  44     08 Sep 2020 04:14        Amylase --, Lipase 21, 20 @ 15:14        Troponin 1.78, CKMB 13.6,  20 @ 04:27  Troponin 2.38, CKMB --, CK -- 20 @ 04:14  Troponin 1.84, CKMB 184.6, CK -- 20 @ 00:25  Troponin 0.80, CKMB --, CK -- 20 @ 15:14        Urinalysis Basic - ( 08 Sep 2020 12:00 )    Color: Light Yellow / Appearance: Clear / S.029 / pH: x  Gluc: x / Ketone: Small  / Bili: Negative / Urobili: <2 mg/dL   Blood: x / Protein: Negative / Nitrite: Negative   Leuk Esterase: Negative / RBC: x / WBC x   Sq Epi: x / Non Sq Epi: x / Bacteria: x Hospital Day:  2d    Subjective:    Patient is a 69y old  Male who presents with a chief complaint of NSTEMI. Overnight pt was bradycardic to the 40s, asymptomatic. Denies c/p this am.       Admitted to medicine for a primary diagnosis of NSTEMI    Past Medical Hx:   Diverticulosis  GERD (gastroesophageal reflux disease)  Diabetes    Past Sx:  No significant past surgical history    Allergies:  sulfa drugs (Rash)    Current Meds:   Standng Meds:  aspirin  chewable 81 milliGRAM(s) Chew daily  atorvastatin 80 milliGRAM(s) Oral at bedtime  chlorhexidine 4% Liquid 1 Application(s) Topical <User Schedule>  dextrose 5%. 1000 milliLiter(s) (50 mL/Hr) IV Continuous <Continuous>  dextrose 50% Injectable 12.5 Gram(s) IV Push once  dextrose 50% Injectable 25 Gram(s) IV Push once  dextrose 50% Injectable 25 Gram(s) IV Push once  enoxaparin Injectable 40 milliGRAM(s) SubCutaneous daily  influenza   Vaccine 0.5 milliLiter(s) IntraMuscular once  insulin glargine Injectable (LANTUS) 16 Unit(s) SubCutaneous at bedtime  insulin lispro (HumaLOG) corrective regimen sliding scale   SubCutaneous three times a day before meals  insulin lispro Injectable (HumaLOG) 4 Unit(s) SubCutaneous three times a day before meals  losartan 50 milliGRAM(s) Oral daily  metoprolol tartrate 25 milliGRAM(s) Oral two times a day  pantoprazole    Tablet 40 milliGRAM(s) Oral before breakfast  prasugrel 10 milliGRAM(s) Oral daily    PRN Meds:  dextrose 40% Gel 15 Gram(s) Oral once PRN Blood Glucose LESS THAN 70 milliGRAM(s)/deciliter  glucagon  Injectable 1 milliGRAM(s) IntraMuscular once PRN Glucose LESS THAN 70 milligrams/deciliter    HOME MEDICATIONS:  aspirin 81 mg oral tablet, chewable: 1 tab(s) orally once a day  Januvia 100 mg oral tablet: 1 tab(s) orally once a day  Levsin SL 0.125 mg sublingual tablet: 1 tab(s) sublingual every 4 hours, As Needed  metFORMIN 1000 mg oral tablet: 1 tab(s) orally 2 times a day  HOLD METFORMIN X 48 HR AFTER CARDIAC CATH  pantoprazole 40 mg oral delayed release tablet: 1 tab(s) orally once a day  simvastatin 20 mg oral tablet: 1 tab(s) orally once a day (at bedtime)  verapamil 120 mg/24 hours oral capsule, extended release: 1 cap(s) orally once a day      Vital Signs:   T(F): 98.6 (20 @ 04:00), Max: 99.6 (20 @ 00:00)  HR: 50 (20 @ 06:00) (49 - 66)  BP: 102/52 (20 @ 06:00) (101/50 - 129/68)  RR: 20 (20 @ 00:00) (18 - 20)  SpO2: 98% (20 @ 06:00) (97% - 99%)      20 @ 07:01  -  20 @ 07:00  --------------------------------------------------------  IN: 875 mL / OUT: 1350 mL / NET: -475 mL    20 @ 07:01  -  20 @ 06:55  --------------------------------------------------------  IN: 425 mL / OUT: 960 mL / NET: -535 mL        Physical Exam:   GENERAL: NAD  HEENT: NCAT  CHEST/LUNG: CTAB  HEART: Regular rate and rhythm; s1 s2 appreciated, No murmurs, rubs, or gallops  ABDOMEN: Soft, Nontender, Nondistended; Bowel sounds present  EXTREMITIES: No LE edema b/l  SKIN: no rashes, no new lesions  NERVOUS SYSTEM:  Alert & Oriented X3  LINES/CATHETERS: Peripheral IV        Labs:                         12.5   5.62  )-----------( 159      ( 09 Sep 2020 04:27 )             37.0       09 Sep 2020 04:27    140    |  109    |  16     ----------------------------<  141    3.6     |  21     |  0.9      Ca    8.4        09 Sep 2020 04:27  Phos  2.5       09 Sep 2020 04:27  Mg     2.1       09 Sep 2020 04:27    TPro  6.1    /  Alb  4.3    /  TBili  1.3    /  DBili  x      /  AST  152    /  ALT  35     /  AlkPhos  44     08 Sep 2020 04:14        Amylase --, Lipase 21, 20 @ 15:14        Troponin 1.78, CKMB 13.6,  20 @ 04:27  Troponin 2.38, CKMB --, CK -- 20 @ 04:14  Troponin 1.84, CKMB 184.6, CK -- 20 @ 00:25  Troponin 0.80, CKMB --, CK -- 20 @ 15:14        Urinalysis Basic - ( 08 Sep 2020 12:00 )    Color: Light Yellow / Appearance: Clear / S.029 / pH: x  Gluc: x / Ketone: Small  / Bili: Negative / Urobili: <2 mg/dL   Blood: x / Protein: Negative / Nitrite: Negative   Leuk Esterase: Negative / RBC: x / WBC x   Sq Epi: x / Non Sq Epi: x / Bacteria: x Hospital Day:  2d    Subjective:    Patient is a 69y old  Male who presents with a chief complaint of NSTEMI. Overnight pt was bradycardic to the 40s, asymptomatic. Denies c/p this am.       Admitted to medicine for a primary diagnosis of NSTEMI    Past Medical Hx:   Diverticulosis  GERD (gastroesophageal reflux disease)  Diabetes    Past Sx:  No significant past surgical history    Allergies:  sulfa drugs (Rash)    Current Meds:   Standng Meds:  aspirin  chewable 81 milliGRAM(s) Chew daily  atorvastatin 80 milliGRAM(s) Oral at bedtime  chlorhexidine 4% Liquid 1 Application(s) Topical <User Schedule>  dextrose 5%. 1000 milliLiter(s) (50 mL/Hr) IV Continuous <Continuous>  dextrose 50% Injectable 12.5 Gram(s) IV Push once  dextrose 50% Injectable 25 Gram(s) IV Push once  dextrose 50% Injectable 25 Gram(s) IV Push once  enoxaparin Injectable 40 milliGRAM(s) SubCutaneous daily  influenza   Vaccine 0.5 milliLiter(s) IntraMuscular once  insulin glargine Injectable (LANTUS) 16 Unit(s) SubCutaneous at bedtime  insulin lispro (HumaLOG) corrective regimen sliding scale   SubCutaneous three times a day before meals  insulin lispro Injectable (HumaLOG) 4 Unit(s) SubCutaneous three times a day before meals  losartan 50 milliGRAM(s) Oral daily  metoprolol tartrate 25 milliGRAM(s) Oral two times a day  pantoprazole    Tablet 40 milliGRAM(s) Oral before breakfast  prasugrel 10 milliGRAM(s) Oral daily    PRN Meds:  dextrose 40% Gel 15 Gram(s) Oral once PRN Blood Glucose LESS THAN 70 milliGRAM(s)/deciliter  glucagon  Injectable 1 milliGRAM(s) IntraMuscular once PRN Glucose LESS THAN 70 milligrams/deciliter    HOME MEDICATIONS:  aspirin 81 mg oral tablet, chewable: 1 tab(s) orally once a day  Januvia 100 mg oral tablet: 1 tab(s) orally once a day  Levsin SL 0.125 mg sublingual tablet: 1 tab(s) sublingual every 4 hours, As Needed  metFORMIN 1000 mg oral tablet: 1 tab(s) orally 2 times a day  HOLD METFORMIN X 48 HR AFTER CARDIAC CATH  pantoprazole 40 mg oral delayed release tablet: 1 tab(s) orally once a day  simvastatin 20 mg oral tablet: 1 tab(s) orally once a day (at bedtime)  verapamil 120 mg/24 hours oral capsule, extended release: 1 cap(s) orally once a day      Vital Signs:   T(F): 98.6 (20 @ 04:00), Max: 99.6 (20 @ 00:00)  HR: 50 (20 @ 06:00) (49 - 66)  BP: 102/52 (20 @ 06:00) (101/50 - 129/68)  RR: 20 (20 @ 00:00) (18 - 20)  SpO2: 98% (20 @ 06:00) (97% - 99%)      20 @ 07:01  -  20 @ 07:00  --------------------------------------------------------  IN: 875 mL / OUT: 1350 mL / NET: -475 mL    20 @ 07:01  -  20 @ 06:55  --------------------------------------------------------  IN: 425 mL / OUT: 960 mL / NET: -535 mL        Physical Exam:   GENERAL: NAD  HEENT: NCAT  CHEST/LUNG: CTAB  HEART: Regular rate and rhythm; s1 s2 appreciated, No murmurs, rubs, or gallops  ABDOMEN: Soft, Nontender, Nondistended; Bowel sounds present  EXTREMITIES: No LE edema b/l  right wrist with 2+ radial pulse, no bleeding  SKIN: no rashes, no new lesions  NERVOUS SYSTEM:  Alert & Oriented X3  LINES/CATHETERS: Peripheral IV        Labs:                         12.5   5.62  )-----------( 159      ( 09 Sep 2020 04:27 )             37.0       09 Sep 2020 04:27    140    |  109    |  16     ----------------------------<  141    3.6     |  21     |  0.9      Ca    8.4        09 Sep 2020 04:27  Phos  2.5       09 Sep 2020 04:27  Mg     2.1       09 Sep 2020 04:27    TPro  6.1    /  Alb  4.3    /  TBili  1.3    /  DBili  x      /  AST  152    /  ALT  35     /  AlkPhos  44     08 Sep 2020 04:14        Amylase --, Lipase 21, 20 @ 15:14        Troponin 1.78, CKMB 13.6,  20 @ 04:27  Troponin 2.38, CKMB --, CK -- 20 @ 04:14  Troponin 1.84, CKMB 184.6, CK -- 20 @ 00:25  Troponin 0.80, CKMB --, CK -- 20 @ 15:14        Urinalysis Basic - ( 08 Sep 2020 12:00 )    Color: Light Yellow / Appearance: Clear / S.029 / pH: x  Gluc: x / Ketone: Small  / Bili: Negative / Urobili: <2 mg/dL   Blood: x / Protein: Negative / Nitrite: Negative   Leuk Esterase: Negative / RBC: x / WBC x   Sq Epi: x / Non Sq Epi: x / Bacteria: x

## 2020-09-09 NOTE — PROGRESS NOTE ADULT - SUBJECTIVE AND OBJECTIVE BOX
Cardiology Follow up    NAHID SAM   69y Male  PAST MEDICAL & SURGICAL HISTORY:  Diverticulosis  GERD (gastroesophageal reflux disease)  Diabetes  No significant past surgical history     HPI:  69 y.o male patient with PMH of DM, HTN, "irregular heart beat", GERD, diverticulosis presented for non-resolving epigastric pain.  History goes back to the day prior to presentation at 4pm when the patient had barbecue. Following the meal, he suddenly felt weak and started sweating profusely, associated with epigastric pain. Episode lasted for about 30 minutes but the pain remained the same. He attributed to his regular indigestion and heartburn but the pain didn't resolve with pepcid and lasted the whole night. For this reason, he decided to present to the ED.  Patient describes the pain as sharp and pressure-like pain, epigastric, non-radiating, with no alleviating or relieving factors, associated with nausea. He denies fever, chills, melena, bright red blood per rectum or any other significant symptoms (07 Sep 2020 20:53)    Allergies    sulfa drugs (Rash)    Intolerances    Patient seen and examined at bedside. No acute events overnight.  Patient without complaints. Pt ambulated without issues/symptoms OOB to chair  Denies CP, SOB, palpitations, or dizziness  No events on telemetry overnight    Vital Signs Last 24 Hrs  T(C): 36.9 (09 Sep 2020 08:00), Max: 37.6 (09 Sep 2020 00:00)  T(F): 98.4 (09 Sep 2020 08:00), Max: 99.6 (09 Sep 2020 00:00)  HR: 58 (09 Sep 2020 08:00) (49 - 66)  BP: 106/72 (09 Sep 2020 08:00) (101/50 - 129/68)  BP(mean): 84 (09 Sep 2020 08:00) (71 - 99)  RR: 18 (09 Sep 2020 08:00) (18 - 20)  SpO2: 97% (09 Sep 2020 08:00) (97% - 99%)    MEDICATIONS  (STANDING):  aspirin  chewable 81 milliGRAM(s) Chew daily  atorvastatin 80 milliGRAM(s) Oral at bedtime  chlorhexidine 4% Liquid 1 Application(s) Topical <User Schedule>  dextrose 5%. 1000 milliLiter(s) (50 mL/Hr) IV Continuous <Continuous>  dextrose 50% Injectable 12.5 Gram(s) IV Push once  dextrose 50% Injectable 25 Gram(s) IV Push once  dextrose 50% Injectable 25 Gram(s) IV Push once  enoxaparin Injectable 40 milliGRAM(s) SubCutaneous daily  influenza   Vaccine 0.5 milliLiter(s) IntraMuscular once  insulin glargine Injectable (LANTUS) 16 Unit(s) SubCutaneous at bedtime  insulin lispro (HumaLOG) corrective regimen sliding scale   SubCutaneous three times a day before meals  insulin lispro Injectable (HumaLOG) 4 Unit(s) SubCutaneous three times a day before meals  losartan 50 milliGRAM(s) Oral daily  metoprolol tartrate 25 milliGRAM(s) Oral two times a day  pantoprazole    Tablet 40 milliGRAM(s) Oral before breakfast  prasugrel 10 milliGRAM(s) Oral daily    MEDICATIONS  (PRN):  dextrose 40% Gel 15 Gram(s) Oral once PRN Blood Glucose LESS THAN 70 milliGRAM(s)/deciliter  glucagon  Injectable 1 milliGRAM(s) IntraMuscular once PRN Glucose LESS THAN 70 milligrams/deciliter    REVIEW OF SYSTEMS:          All negative except as mentioned in HPI    PHYSICAL EXAM:           CONSTITUTIONAL: Well-developed; well-nourished; in no acute distress  	SKIN: warm, dry  	HEAD: Normocephalic; atraumatic  	EYES: PERRL.  	ENT: No nasal discharge, airway clear, mucous membranes moist  	NECK: Supple; non tender.  	CARD: +S1, +S2, no murmurs, gallops, or rubs. Regular rate and rhythm    	RESP: No wheezes, rales or rhonchi. CTA B/L  	ABD: soft ntnd, + BS x 4 quadrants  	EXT: moves all extremities,  no clubbing, cyanosis or edema  	NEURO: Alert and oriented x3, no focal deficits          PSYCH: Cooperative, appropriate          VASCULAR:  + Rad / + PTs / +  DPs          EXTREMITY:              Right Radial: Dressing D/C/I, pressure dressing removed, access site soft, no hematoma, no pain, + pulses, no sign of infection, no numbness            ECG:   < from: 12 Lead ECG (09.09.20 @ 06:37) >  Ventricular Rate 53 BPM    Atrial Rate 53 BPM    P-R Interval 168 ms    QRS Duration 82 ms    Q-T Interval 484 ms    QTC Calculation(Bezet) 454 ms    P Axis 30 degrees    R Axis -27 degrees    T Axis -64 degrees    Diagnosis Line Sinus bradycardia  Voltage criteria for left ventricular hypertrophy  Inferior infarct , age undetermined  ST & T wave abnormality, consider lateral ischemia  Abnormal ECG    Confirmed by Nahum Umaña ([id number]) on 9/9/2020 7:19:32 AM                                                                                                                2D ECHO:  < from: Transthoracic Echocardiogram (09.08.20 @ 09:44) >  Summary:   1.Normal global left ventricular systolic function.   2. LV Ejection Fraction by [place]'s Method with a biplane EF of 71 %.   3. Normal left atrial size.   4. Normal right atrial size.   5. There is no evidence of pericardial effusion.   6. Mild thickening of the anterior and posterior mitral valve leaflets.   7. No evidence of mitral valve regurgitation.    LABS:                        12.5   5.62  )-----------( 159      ( 09 Sep 2020 04:27 )             37.0     09-09    140  |  109  |  16  ----------------------------<  141<H>  3.6   |  21  |  0.9    Ca    8.4<L>      09 Sep 2020 04:27  Phos  2.5     09-09  Mg     2.1     09-09    TPro  6.1  /  Alb  4.3  /  TBili  1.3<H>  /  DBili  x   /  AST  152<H>  /  ALT  35  /  AlkPhos  44  09-08    CARDIAC MARKERS ( 09 Sep 2020 04:27 )  x     / 1.78 ng/mL / 662 U/L / x     / 13.6 ng/mL  CARDIAC MARKERS ( 08 Sep 2020 04:14 )  x     / 2.38 ng/mL / x     / x     / x      CARDIAC MARKERS ( 08 Sep 2020 00:25 )  x     / 1.84 ng/mL / x     / x     / 184.6 ng/mL  CARDIAC MARKERS ( 07 Sep 2020 15:14 )  x     / 0.80 ng/mL / x     / x     / x        Magnesium, Serum: 2.1 mg/dL [1.8 - 2.4] (09-09-20 @ 04:27)  LIVER FUNCTIONS - ( 08 Sep 2020 04:14 )  Alb: 4.3 g/dL / Pro: 6.1 g/dL / ALK PHOS: 44 U/L / ALT: 35 U/L / AST: 152 U/L / GGT: x           A/P:  I discussed the case with Cardiologist Dr. Noel and recommend the following:    S/P PCI:   CAD, s/p IWMI, s/p PCI of RCA                      Ambulate patient around the unit                    Change Lopressor to Toprol 25 mg PO QHS, first dose tonight if SB/P > 100, HR > 60  	     Continue DAPT ( Aspirin 81 mg PO Daily and Effient 10 mg PO Daily ), ARB, B-Blocker, Statin Therapy                   Patient agreeing to take DAPT for at least one year or as directed by cardiologist                    Pt given instructions on importance of taking antiplatelet medication or risk acute stent thrombosis/death                   Post cath instructions, access site care and activity restrictions reviewed with patient                     Discussed with patient to return to hospital if experience chest pain, shortness breath, dizziness and site bleeding                   Aggressive risk factor modification, diet counseling, smoking cessation discussed with patient                       Can discharge patient from cardiac standpoint after ambulating without symptoms                     Follow up with Cardiology Dr. Noel in two weeks. Instructed to call and make an appointment Cardiology Follow up    NAHID SAM   69y Male  PAST MEDICAL & SURGICAL HISTORY:  Diverticulosis  GERD (gastroesophageal reflux disease)  Diabetes  No significant past surgical history     HPI:  69 y.o male patient with PMH of DM, HTN, "irregular heart beat", GERD, diverticulosis presented for non-resolving epigastric pain.  History goes back to the day prior to presentation at 4pm when the patient had barbecue. Following the meal, he suddenly felt weak and started sweating profusely, associated with epigastric pain. Episode lasted for about 30 minutes but the pain remained the same. He attributed to his regular indigestion and heartburn but the pain didn't resolve with pepcid and lasted the whole night. For this reason, he decided to present to the ED.  Patient describes the pain as sharp and pressure-like pain, epigastric, non-radiating, with no alleviating or relieving factors, associated with nausea. He denies fever, chills, melena, bright red blood per rectum or any other significant symptoms (07 Sep 2020 20:53)    Allergies    sulfa drugs (Rash)      Intolerances    Patient seen and examined at bedside. No acute events overnight.  Patient without complaints. Pt ambulated without issues/symptoms OOB to chair  Denies CP, SOB, palpitations, or dizziness  No events on telemetry overnight    Vital Signs Last 24 Hrs  T(C): 36.9 (09 Sep 2020 08:00), Max: 37.6 (09 Sep 2020 00:00)  T(F): 98.4 (09 Sep 2020 08:00), Max: 99.6 (09 Sep 2020 00:00)  HR: 58 (09 Sep 2020 08:00) (49 - 66)  BP: 106/72 (09 Sep 2020 08:00) (101/50 - 129/68)  BP(mean): 84 (09 Sep 2020 08:00) (71 - 99)  RR: 18 (09 Sep 2020 08:00) (18 - 20)  SpO2: 97% (09 Sep 2020 08:00) (97% - 99%)    MEDICATIONS  (STANDING):  aspirin  chewable 81 milliGRAM(s) Chew daily  atorvastatin 80 milliGRAM(s) Oral at bedtime  chlorhexidine 4% Liquid 1 Application(s) Topical <User Schedule>  dextrose 5%. 1000 milliLiter(s) (50 mL/Hr) IV Continuous <Continuous>  dextrose 50% Injectable 12.5 Gram(s) IV Push once  dextrose 50% Injectable 25 Gram(s) IV Push once  dextrose 50% Injectable 25 Gram(s) IV Push once  enoxaparin Injectable 40 milliGRAM(s) SubCutaneous daily  influenza   Vaccine 0.5 milliLiter(s) IntraMuscular once  insulin glargine Injectable (LANTUS) 16 Unit(s) SubCutaneous at bedtime  insulin lispro (HumaLOG) corrective regimen sliding scale   SubCutaneous three times a day before meals  insulin lispro Injectable (HumaLOG) 4 Unit(s) SubCutaneous three times a day before meals  losartan 50 milliGRAM(s) Oral daily  metoprolol tartrate 25 milliGRAM(s) Oral two times a day  pantoprazole    Tablet 40 milliGRAM(s) Oral before breakfast  prasugrel 10 milliGRAM(s) Oral daily    MEDICATIONS  (PRN):  dextrose 40% Gel 15 Gram(s) Oral once PRN Blood Glucose LESS THAN 70 milliGRAM(s)/deciliter  glucagon  Injectable 1 milliGRAM(s) IntraMuscular once PRN Glucose LESS THAN 70 milligrams/deciliter    REVIEW OF SYSTEMS:          All negative except as mentioned in HPI    PHYSICAL EXAM:           CONSTITUTIONAL: Well-developed; well-nourished; in no acute distress  	SKIN: warm, dry  	HEAD: Normocephalic; atraumatic  	EYES: PERRL.  	ENT: No nasal discharge, airway clear, mucous membranes moist  	NECK: Supple; non tender.  	CARD: +S1, +S2, no murmurs, gallops, or rubs. Regular rate and rhythm    	RESP: No wheezes, rales or rhonchi. CTA B/L  	ABD: soft ntnd, + BS x 4 quadrants  	EXT: moves all extremities,  no clubbing, cyanosis or edema  	NEURO: Alert and oriented x3, no focal deficits          PSYCH: Cooperative, appropriate          VASCULAR:  + Rad / + PTs / +  DPs          EXTREMITY:              Right Radial: Dressing D/C/I, pressure dressing removed, access site soft, no hematoma, no pain, + pulses, no sign of infection, no numbness            ECG:   < from: 12 Lead ECG (09.09.20 @ 06:37) >  Ventricular Rate 53 BPM    Atrial Rate 53 BPM    P-R Interval 168 ms    QRS Duration 82 ms    Q-T Interval 484 ms    QTC Calculation(Bezet) 454 ms    P Axis 30 degrees    R Axis -27 degrees    T Axis -64 degrees    Diagnosis Line Sinus bradycardia  Voltage criteria for left ventricular hypertrophy  Inferior infarct , age undetermined  ST & T wave abnormality, consider lateral ischemia  Abnormal ECG    Confirmed by Nahum Umaña ([id number]) on 9/9/2020 7:19:32 AM                                                                                                                2D ECHO:  < from: Transthoracic Echocardiogram (09.08.20 @ 09:44) >  Summary:   1.Normal global left ventricular systolic function.   2. LV Ejection Fraction by [place]'s Method with a biplane EF of 71 %.   3. Normal left atrial size.   4. Normal right atrial size.   5. There is no evidence of pericardial effusion.   6. Mild thickening of the anterior and posterior mitral valve leaflets.   7. No evidence of mitral valve regurgitation.    LABS:                        12.5   5.62  )-----------( 159      ( 09 Sep 2020 04:27 )             37.0     09-09    140  |  109  |  16  ----------------------------<  141<H>  3.6   |  21  |  0.9    Ca    8.4<L>      09 Sep 2020 04:27  Phos  2.5     09-09  Mg     2.1     09-09    TPro  6.1  /  Alb  4.3  /  TBili  1.3<H>  /  DBili  x   /  AST  152<H>  /  ALT  35  /  AlkPhos  44  09-08    CARDIAC MARKERS ( 09 Sep 2020 04:27 )  x     / 1.78 ng/mL / 662 U/L / x     / 13.6 ng/mL  CARDIAC MARKERS ( 08 Sep 2020 04:14 )  x     / 2.38 ng/mL / x     / x     / x      CARDIAC MARKERS ( 08 Sep 2020 00:25 )  x     / 1.84 ng/mL / x     / x     / 184.6 ng/mL  CARDIAC MARKERS ( 07 Sep 2020 15:14 )  x     / 0.80 ng/mL / x     / x     / x        Magnesium, Serum: 2.1 mg/dL [1.8 - 2.4] (09-09-20 @ 04:27)  LIVER FUNCTIONS - ( 08 Sep 2020 04:14 )  Alb: 4.3 g/dL / Pro: 6.1 g/dL / ALK PHOS: 44 U/L / ALT: 35 U/L / AST: 152 U/L / GGT: x           A/P:  I discussed the case with Cardiologist Dr. Noel and recommend the following:    S/P PCI:   CAD, s/p IWMI, s/p PCI of RCA                      Ambulate patient around the unit                    Change Lopressor to Toprol 25 mg PO QHS, first dose tonight if SB/P > 100, HR > 60  	     Continue DAPT ( Aspirin 81 mg PO Daily and Effient 10 mg PO Daily ), ARB, B-Blocker, Statin Therapy                   Patient agreeing to take DAPT for at least one year or as directed by cardiologist                    Pt given instructions on importance of taking antiplatelet medication or risk acute stent thrombosis/death                   Post cath instructions, access site care and activity restrictions reviewed with patient                     Discussed with patient to return to hospital if experience chest pain, shortness breath, dizziness and site bleeding                   Aggressive risk factor modification, diet counseling, smoking cessation discussed with patient                       Can discharge patient from cardiac standpoint after ambulating without symptoms                     Follow up with Cardiology Dr. Noel in two weeks. Instructed to call and make an appointment

## 2020-09-09 NOTE — DISCHARGE NOTE NURSING/CASE MANAGEMENT/SOCIAL WORK - PATIENT PORTAL LINK FT
You can access the FollowMyHealth Patient Portal offered by [facility] by registering at the following website: [email or web address]. By joining [facility]’s FollowMyHealth portal, you will also be able to view your health information using other applications (apps) compatible with our system.

## 2020-09-09 NOTE — DISCHARGE NOTE PROVIDER - NSDCMRMEDTOKEN_GEN_ALL_CORE_FT
aspirin 81 mg oral tablet, chewable: 1 tab(s) orally once a day  atorvastatin 80 mg oral tablet: 1 tab(s) orally once a day (at bedtime)  Januvia 100 mg oral tablet: 1 tab(s) orally once a day  Levsin SL 0.125 mg sublingual tablet: 1 tab(s) sublingual every 4 hours, As Needed  losartan 50 mg oral tablet: 1 tab(s) orally once a day  metFORMIN 1000 mg oral tablet: 1 tab(s) orally 2 times a day  HOLD METFORMIN X 48 HR AFTER CARDIAC CATH  metoprolol succinate 25 mg oral tablet, extended release: 1 tab(s) orally once a day (at bedtime)  pantoprazole 40 mg oral delayed release tablet: 1 tab(s) orally once a day  prasugrel 10 mg oral tablet: 1 tab(s) orally once a day MDD:1 aspirin 81 mg oral tablet, chewable: 1 tab(s) orally once a day  atorvastatin 80 mg oral tablet: 1 tab(s) orally once a day (at bedtime)  Januvia 100 mg oral tablet: 1 tab(s) orally once a day  Levsin SL 0.125 mg sublingual tablet: 1 tab(s) sublingual every 4 hours, As Needed  losartan 25 mg oral tablet: 1 tab(s) orally once a day  metFORMIN 1000 mg oral tablet: 1 tab(s) orally 2 times a day  HOLD METFORMIN X 48 HR AFTER CARDIAC CATH  metoprolol succinate 25 mg oral tablet, extended release: 1 tab(s) orally once a day (at bedtime)  pantoprazole 40 mg oral delayed release tablet: 1 tab(s) orally once a day  prasugrel 10 mg oral tablet: 1 tab(s) orally once a day MDD:1

## 2020-09-10 ENCOUNTER — NON-APPOINTMENT (OUTPATIENT)
Age: 70
End: 2020-09-10

## 2020-09-10 PROBLEM — K57.90 DIVERTICULOSIS OF INTESTINE, PART UNSPECIFIED, WITHOUT PERFORATION OR ABSCESS WITHOUT BLEEDING: Chronic | Status: ACTIVE | Noted: 2020-09-07

## 2020-09-10 PROBLEM — K21.9 GASTRO-ESOPHAGEAL REFLUX DISEASE WITHOUT ESOPHAGITIS: Chronic | Status: ACTIVE | Noted: 2020-09-07

## 2020-09-10 PROBLEM — E11.9 TYPE 2 DIABETES MELLITUS WITHOUT COMPLICATIONS: Chronic | Status: ACTIVE | Noted: 2020-09-07

## 2020-09-11 ENCOUNTER — APPOINTMENT (OUTPATIENT)
Dept: CARE COORDINATION | Facility: HOME HEALTH | Age: 70
End: 2020-09-11
Payer: MEDICARE

## 2020-09-11 PROCEDURE — 99347 HOME/RES VST EST SF MDM 20: CPT | Mod: 95

## 2020-09-11 RX ORDER — HYOSCYAMINE SULFATE 0.12 MG/1
0.12 TABLET ORAL
Refills: 0 | Status: DISCONTINUED | COMMUNITY
End: 2020-09-11

## 2020-09-11 RX ORDER — VERAPAMIL HYDROCHLORIDE 120 MG/1
120 CAPSULE, EXTENDED RELEASE ORAL DAILY
Qty: 90 | Refills: 3 | Status: DISCONTINUED | COMMUNITY
Start: 2019-01-08 | End: 2020-09-11

## 2020-09-11 RX ORDER — SIMVASTATIN 20 MG/1
20 TABLET, FILM COATED ORAL DAILY
Qty: 90 | Refills: 3 | Status: DISCONTINUED | COMMUNITY
End: 2020-09-11

## 2020-09-11 NOTE — PLAN
[FreeTextEntry1] : s/p recent NSTEMI- c/w Aspirin 81mg daily, metoprolol 25mg PO QHS, Losartan 50mg daily ( adhere to BP parameters on dc) , Atorvastatin 80mg PO QHS.\par C/w Prasugrel 10 mg PO QD \par heart healthy diet, advised to adhere to all restrictions per dc orders , cardiology follow up \par DM- c/w metformin / januvia , low carb diet, BS monitoring ( patient states he needs more lancets , will follow up with NP and script will be sent) \par c/w current medication regimen\par cardiology / pcp follow up

## 2020-09-11 NOTE — PHYSICAL EXAM
[No Respiratory Distress] : no respiratory distress  [No Acute Distress] : no acute distress [Well-Appearing] : well-appearing [No Accessory Muscle Use] : no accessory muscle use [No Edema] : there was no peripheral edema [Non-distended] : non-distended [No Focal Deficits] : no focal deficits [Normal Gait] : normal gait [Normal Affect] : the affect was normal [Alert and Oriented x3] : oriented to person, place, and time [Normal Insight/Judgement] : insight and judgment were intact [de-identified] : rt wrist , no bleeding , per patient non tender , warm , mobile

## 2020-09-11 NOTE — COUNSELING
[de-identified] : Pt was informed about CN’s role/ STARS program and overview of transitional care reviewed with patient. In addition, yellow contact card was provided. Pt educated on topics of importance such as compliance with all provider visits, prescribed medication regimen, and low salt die/ low carb diet. Pt encouraged calling CN with any issues, concerns or questions, also educated to notify CN if experiencing CP, SOB ,,  dizziness, lightheadedness, n/v/d/c,  and swelling to extremities . Reassurance provided. Will continue to monitor\par \par

## 2020-09-11 NOTE — HISTORY OF PRESENT ILLNESS
[Home] : at home, [unfilled] , at the time of the visit. [Other Location: e.g. Home (Enter Location, City,State)___] : at [unfilled] [Partner] : partner [Verbal consent obtained from patient] : the patient, [unfilled] [FreeTextEntry1] : s/p hospitalization for NSTEMI [de-identified] : Patient is a 69 y.o maleenrolled in the STARS program with PMH of DM, HTN, "irregular heart beat", GERD, diverticulosis admitted to CCU for NSTEMI, he was seen by cardiology and underwent cath on 9/7 with a MANASA placed on the RCA. Patient was found stable for DC by the inpateint team and d/c home with out HCS, patient observed via tele health partner with patient alert in no distress denies c/p, SOB, palpitations, dizziness, and  NVD .

## 2020-09-12 ENCOUNTER — APPOINTMENT (OUTPATIENT)
Dept: CARDIOLOGY | Facility: CLINIC | Age: 70
End: 2020-09-12
Payer: MEDICARE

## 2020-09-12 VITALS
BODY MASS INDEX: 38.83 KG/M2 | TEMPERATURE: 97.4 F | HEART RATE: 64 BPM | HEIGHT: 62 IN | DIASTOLIC BLOOD PRESSURE: 80 MMHG | WEIGHT: 211 LBS | SYSTOLIC BLOOD PRESSURE: 120 MMHG

## 2020-09-12 VITALS
HEIGHT: 62 IN | DIASTOLIC BLOOD PRESSURE: 60 MMHG | TEMPERATURE: 97.2 F | BODY MASS INDEX: 29.81 KG/M2 | WEIGHT: 162 LBS | SYSTOLIC BLOOD PRESSURE: 110 MMHG | HEART RATE: 57 BPM

## 2020-09-12 PROCEDURE — 93000 ELECTROCARDIOGRAM COMPLETE: CPT

## 2020-09-12 PROCEDURE — 99214 OFFICE O/P EST MOD 30 MIN: CPT

## 2020-09-12 NOTE — PHYSICAL EXAM
[General Appearance - Well Developed] : well developed [Normal Appearance] : normal appearance [Well Groomed] : well groomed [General Appearance - Well Nourished] : well nourished [No Deformities] : no deformities [General Appearance - In No Acute Distress] : no acute distress [Normal Conjunctiva] : the conjunctiva exhibited no abnormalities [Eyelids - No Xanthelasma] : the eyelids demonstrated no xanthelasmas [Normal Oral Mucosa] : normal oral mucosa [No Oral Pallor] : no oral pallor [No Oral Cyanosis] : no oral cyanosis [Respiration, Rhythm And Depth] : normal respiratory rhythm and effort [Exaggerated Use Of Accessory Muscles For Inspiration] : no accessory muscle use [Auscultation Breath Sounds / Voice Sounds] : lungs were clear to auscultation bilaterally [Abdomen Soft] : soft [Abdomen Tenderness] : non-tender [Abdomen Mass (___ Cm)] : no abdominal mass palpated [Abnormal Walk] : normal gait [Nail Clubbing] : no clubbing of the fingernails [Cyanosis, Localized] : no localized cyanosis [Petechial Hemorrhages (___cm)] : no petechial hemorrhages [] : no rash [Skin Color & Pigmentation] : normal skin color and pigmentation [No Venous Stasis] : no venous stasis [Skin Lesions] : no skin lesions [No Skin Ulcers] : no skin ulcer [No Xanthoma] : no  xanthoma was observed [Oriented To Time, Place, And Person] : oriented to person, place, and time [Affect] : the affect was normal [Mood] : the mood was normal [No Anxiety] : not feeling anxious [Normal Rate] : normal [Rhythm Regular] : regular [No Murmur] : no murmurs heard [2+] : left 2+ [No Pitting Edema] : no pitting edema present [FreeTextEntry1] : No JVD [Lt] : no varicose veins of the left leg [Rt] : no varicose veins of the right leg

## 2020-09-12 NOTE — ASSESSMENT
[FreeTextEntry1] : The patient had an IWMI STEMI with occluded RCA . The patient had a MANASA of the RCA . The patient has reaction of SOB with Brlinital Said to have normal LV systolic function on echo . Has severe lesion in the ostium of the diagonal branch and nonobstructive disease elsewhere .

## 2020-09-12 NOTE — HISTORY OF PRESENT ILLNESS
[FreeTextEntry1] : The patient had epigastric pain  and diaphoresis . He had an STEMI RCA occlusion . The patient had PCI and stent of the RCA . The patient had "normal " LV systolic function on echo . The patient had SOB which was thought to be from ilinta . This as stopped and he was started on Prasugrel.

## 2020-09-14 DIAGNOSIS — Z82.49 FAMILY HISTORY OF ISCHEMIC HEART DISEASE AND OTHER DISEASES OF THE CIRCULATORY SYSTEM: ICD-10-CM

## 2020-09-14 DIAGNOSIS — Z79.82 LONG TERM (CURRENT) USE OF ASPIRIN: ICD-10-CM

## 2020-09-14 DIAGNOSIS — I10 ESSENTIAL (PRIMARY) HYPERTENSION: ICD-10-CM

## 2020-09-14 DIAGNOSIS — K57.90 DIVERTICULOSIS OF INTESTINE, PART UNSPECIFIED, WITHOUT PERFORATION OR ABSCESS WITHOUT BLEEDING: ICD-10-CM

## 2020-09-14 DIAGNOSIS — R00.1 BRADYCARDIA, UNSPECIFIED: ICD-10-CM

## 2020-09-14 DIAGNOSIS — K21.9 GASTRO-ESOPHAGEAL REFLUX DISEASE WITHOUT ESOPHAGITIS: ICD-10-CM

## 2020-09-14 DIAGNOSIS — E11.9 TYPE 2 DIABETES MELLITUS WITHOUT COMPLICATIONS: ICD-10-CM

## 2020-09-14 DIAGNOSIS — I21.4 NON-ST ELEVATION (NSTEMI) MYOCARDIAL INFARCTION: ICD-10-CM

## 2020-09-14 DIAGNOSIS — Z79.84 LONG TERM (CURRENT) USE OF ORAL HYPOGLYCEMIC DRUGS: ICD-10-CM

## 2020-09-14 DIAGNOSIS — Z88.2 ALLERGY STATUS TO SULFONAMIDES: ICD-10-CM

## 2020-09-21 ENCOUNTER — NON-APPOINTMENT (OUTPATIENT)
Age: 70
End: 2020-09-21

## 2020-09-29 ENCOUNTER — APPOINTMENT (OUTPATIENT)
Dept: CARE COORDINATION | Facility: HOME HEALTH | Age: 70
End: 2020-09-29
Payer: MEDICARE

## 2020-09-29 DIAGNOSIS — R05 COUGH: ICD-10-CM

## 2020-09-29 PROCEDURE — 99348 HOME/RES VST EST LOW MDM 30: CPT | Mod: 95

## 2020-09-29 RX ORDER — AZELASTINE HYDROCHLORIDE 137 UG/1
137 SPRAY, METERED NASAL TWICE DAILY
Qty: 1 | Refills: 0 | Status: ACTIVE | COMMUNITY
Start: 2020-09-29 | End: 1900-01-01

## 2020-09-29 RX ORDER — BLOOD SUGAR DIAGNOSTIC
STRIP MISCELLANEOUS 3 TIMES DAILY
Qty: 100 | Refills: 2 | Status: ACTIVE | COMMUNITY
Start: 2020-09-29 | End: 1900-01-01

## 2020-09-29 NOTE — HISTORY OF PRESENT ILLNESS
[Home] : at home, [unfilled] , at the time of the visit. [Other Location: e.g. Home (Enter Location, City,State)___] : at [unfilled] [Partner] : partner [Verbal consent obtained from patient] : the patient, [unfilled] [FreeTextEntry1] : intermittent cough / PND [de-identified] : Patient is a 68 y/o male enrolled in the STARS program with a PMH of DM, HTN , "irregular heart beat", CAD recent NSTEMII, who reached out to NP to discuss concerns over his "intermittent cough", PND , worse HS and intermittent mild fatigue at times since dc from hospital , he denies c/p, SOB, wt gain, extremity swelling, fever, NVD, chills or recent travel or contact  with sick contacts. Patient also admits to feeling "down" since COVID and is unable to socialize as he normally did before the pandemic , denies suicidal / homicidal thoughts. admits to non compliance with low salt ,low carb, bland diet

## 2020-09-29 NOTE — PLAN
[FreeTextEntry1] : cough - likely secondary to PND , Astelin nasal spray one spray BID to each nostril , saline nasal irrigations.\par GERD- c/w Pantoprazole, advised not to lay down immediately post eating, elevate HOB, bland diet \par HTN- c/w current medication regimen, low salt low fat diet reviewed, advised  I wiill  referral to healthy living to contact patient and educate on cardiac /low carb diet . \par behavioral health referral offered patient states will consider and contact NP  \par Patient also requested new PCP referral , options given to patient, and sent testing strips for Glucometer to pharmacy as well. \par Will follow up in 7 days and worsening instructions provided

## 2020-09-29 NOTE — REVIEW OF SYSTEMS
[Fever] : no fever [Chills] : no chills [Fatigue] : fatigue [Night Sweats] : no night sweats [Recent Change In Weight] : ~T no recent weight change [Shortness Of Breath] : no shortness of breath [Wheezing] : no wheezing [Cough] : cough [Dyspnea on Exertion] : not dyspnea on exertion [Negative] : Neurological [FreeTextEntry2] : mild intermittent [FreeTextEntry6] : intermittent cough worse HS

## 2020-09-29 NOTE — PHYSICAL EXAM
[No Acute Distress] : no acute distress [Well-Appearing] : well-appearing [No Respiratory Distress] : no respiratory distress  [No Accessory Muscle Use] : no accessory muscle use [No Edema] : there was no peripheral edema [Non-distended] : non-distended [No Focal Deficits] : no focal deficits [Normal Gait] : normal gait [Normal Affect] : the affect was normal [Alert and Oriented x3] : oriented to person, place, and time [Normal Mood] : the mood was normal [Normal Insight/Judgement] : insight and judgment were intact

## 2020-11-04 ENCOUNTER — APPOINTMENT (OUTPATIENT)
Dept: CARDIOLOGY | Facility: CLINIC | Age: 70
End: 2020-11-04
Payer: MEDICARE

## 2020-11-04 VITALS
DIASTOLIC BLOOD PRESSURE: 64 MMHG | WEIGHT: 161 LBS | TEMPERATURE: 96.5 F | HEART RATE: 59 BPM | SYSTOLIC BLOOD PRESSURE: 130 MMHG

## 2020-11-04 PROCEDURE — 93000 ELECTROCARDIOGRAM COMPLETE: CPT

## 2020-11-04 PROCEDURE — 99214 OFFICE O/P EST MOD 30 MIN: CPT

## 2020-11-04 NOTE — HISTORY OF PRESENT ILLNESS
[FreeTextEntry1] : The patient has not had chest pain . He has had periods of LOPEZ in particular when he climbs stairs. He is walking and has good days and bad days .

## 2020-11-04 NOTE — ASSESSMENT
[FreeTextEntry1] : The patient had an IWMI STEMI with occluded RCA . The patient had a MANASA of the RCA . The patient has reaction of SOB with Brilinta Said to have normal LV systolic function on echo . Has severe lesion in the ostium of the diagonal branch and nonobstructive disease elsewhere . The patient has had some LOPEZ but this is intermittent . He has had a decreased appetite. FBS is not controlled. He is under the care of his gastroenterologist and he has been referred back to his PCP for his DM control.

## 2020-11-08 LAB — NT-PROBNP SERPL-MCNC: 676 PG/ML

## 2020-11-18 ENCOUNTER — APPOINTMENT (OUTPATIENT)
Dept: CARDIOLOGY | Facility: CLINIC | Age: 70
End: 2020-11-18
Payer: MEDICARE

## 2020-11-18 PROCEDURE — 93306 TTE W/DOPPLER COMPLETE: CPT

## 2021-01-14 ENCOUNTER — APPOINTMENT (OUTPATIENT)
Dept: CARDIOLOGY | Facility: CLINIC | Age: 71
End: 2021-01-14
Payer: MEDICARE

## 2021-01-14 VITALS
DIASTOLIC BLOOD PRESSURE: 72 MMHG | HEART RATE: 76 BPM | HEIGHT: 62 IN | WEIGHT: 154 LBS | TEMPERATURE: 97 F | BODY MASS INDEX: 28.34 KG/M2 | SYSTOLIC BLOOD PRESSURE: 128 MMHG

## 2021-01-14 PROCEDURE — 93000 ELECTROCARDIOGRAM COMPLETE: CPT

## 2021-01-14 PROCEDURE — 99214 OFFICE O/P EST MOD 30 MIN: CPT

## 2021-01-14 NOTE — HISTORY OF PRESENT ILLNESS
[FreeTextEntry1] : The patient has had some fatigue  His SOB has improved . Echo showed normal LV systolic function. The patient  patient has not had chest pain .

## 2021-01-14 NOTE — PHYSICAL EXAM
[General Appearance - Well Developed] : well developed [Normal Appearance] : normal appearance [Well Groomed] : well groomed [General Appearance - Well Nourished] : well nourished [No Deformities] : no deformities [General Appearance - In No Acute Distress] : no acute distress [Normal Conjunctiva] : the conjunctiva exhibited no abnormalities [Eyelids - No Xanthelasma] : the eyelids demonstrated no xanthelasmas [No Oral Pallor] : no oral pallor [Normal Oral Mucosa] : normal oral mucosa [No Oral Cyanosis] : no oral cyanosis [Respiration, Rhythm And Depth] : normal respiratory rhythm and effort [Exaggerated Use Of Accessory Muscles For Inspiration] : no accessory muscle use [Auscultation Breath Sounds / Voice Sounds] : lungs were clear to auscultation bilaterally [Abdomen Soft] : soft [Abdomen Tenderness] : non-tender [Abnormal Walk] : normal gait [Abdomen Mass (___ Cm)] : no abdominal mass palpated [Nail Clubbing] : no clubbing of the fingernails [Cyanosis, Localized] : no localized cyanosis [Petechial Hemorrhages (___cm)] : no petechial hemorrhages [Skin Color & Pigmentation] : normal skin color and pigmentation [No Venous Stasis] : no venous stasis [] : no rash [Skin Lesions] : no skin lesions [No Skin Ulcers] : no skin ulcer [No Xanthoma] : no  xanthoma was observed [Oriented To Time, Place, And Person] : oriented to person, place, and time [Affect] : the affect was normal [Mood] : the mood was normal [No Anxiety] : not feeling anxious [Normal Rate] : normal [Rhythm Regular] : regular [No Murmur] : no murmurs heard [2+] : left 2+ [No Pitting Edema] : no pitting edema present [FreeTextEntry1] : No JVD [Rt] : no varicose veins of the right leg [Lt] : no varicose veins of the left leg

## 2021-02-03 ENCOUNTER — APPOINTMENT (OUTPATIENT)
Dept: CARDIOLOGY | Facility: CLINIC | Age: 71
End: 2021-02-03

## 2021-03-25 ENCOUNTER — APPOINTMENT (OUTPATIENT)
Dept: CARDIOLOGY | Facility: CLINIC | Age: 71
End: 2021-03-25

## 2021-04-29 ENCOUNTER — APPOINTMENT (OUTPATIENT)
Dept: PULMONOLOGY | Facility: CLINIC | Age: 71
End: 2021-04-29
Payer: MEDICARE

## 2021-04-29 VITALS
DIASTOLIC BLOOD PRESSURE: 72 MMHG | WEIGHT: 160 LBS | HEART RATE: 66 BPM | RESPIRATION RATE: 14 BRPM | HEIGHT: 62 IN | OXYGEN SATURATION: 97 % | BODY MASS INDEX: 29.44 KG/M2 | SYSTOLIC BLOOD PRESSURE: 122 MMHG

## 2021-04-29 PROCEDURE — 99203 OFFICE O/P NEW LOW 30 MIN: CPT | Mod: 25

## 2021-04-29 PROCEDURE — 71046 X-RAY EXAM CHEST 2 VIEWS: CPT

## 2021-04-29 NOTE — DISCUSSION/SUMMARY
[FreeTextEntry1] : SOB ON EXERTION/ INTERMITTENT\par COUGH ON DEEP INSPIRATION\par PFT/ WANT TO HOLD FOR INHALERS. SLEEP STUDY

## 2021-04-29 NOTE — HISTORY OF PRESENT ILLNESS
[Shortness of Breath] : Shortness of Breath [Dyspnea] : dyspnea [Fatigue] : fatigue [Chest Pain] : chest pain [Cough] : cough [Fever] : no fever [Hemoptysis] : no hemoptysis [Weight Gain] : weight gain [Leg Pain] : no leg pain [Edema] : no edema

## 2021-05-27 ENCOUNTER — APPOINTMENT (OUTPATIENT)
Dept: CARDIOLOGY | Facility: CLINIC | Age: 71
End: 2021-05-27
Payer: MEDICARE

## 2021-05-27 VITALS
HEIGHT: 62 IN | SYSTOLIC BLOOD PRESSURE: 130 MMHG | BODY MASS INDEX: 29.26 KG/M2 | TEMPERATURE: 97.1 F | DIASTOLIC BLOOD PRESSURE: 70 MMHG | WEIGHT: 159 LBS

## 2021-05-27 PROCEDURE — 93000 ELECTROCARDIOGRAM COMPLETE: CPT

## 2021-05-27 PROCEDURE — 99214 OFFICE O/P EST MOD 30 MIN: CPT

## 2021-05-27 NOTE — PHYSICAL EXAM
[Normal Appearance] : normal appearance [General Appearance - Well Developed] : well developed [Well Groomed] : well groomed [General Appearance - Well Nourished] : well nourished [No Deformities] : no deformities [General Appearance - In No Acute Distress] : no acute distress [Normal Conjunctiva] : the conjunctiva exhibited no abnormalities [Eyelids - No Xanthelasma] : the eyelids demonstrated no xanthelasmas [Normal Oral Mucosa] : normal oral mucosa [No Oral Pallor] : no oral pallor [No Oral Cyanosis] : no oral cyanosis [Respiration, Rhythm And Depth] : normal respiratory rhythm and effort [Exaggerated Use Of Accessory Muscles For Inspiration] : no accessory muscle use [Auscultation Breath Sounds / Voice Sounds] : lungs were clear to auscultation bilaterally [Abdomen Soft] : soft [Abdomen Tenderness] : non-tender [Abdomen Mass (___ Cm)] : no abdominal mass palpated [Abnormal Walk] : normal gait [Nail Clubbing] : no clubbing of the fingernails [Cyanosis, Localized] : no localized cyanosis [Petechial Hemorrhages (___cm)] : no petechial hemorrhages [Skin Color & Pigmentation] : normal skin color and pigmentation [] : no rash [No Venous Stasis] : no venous stasis [Skin Lesions] : no skin lesions [No Xanthoma] : no  xanthoma was observed [No Skin Ulcers] : no skin ulcer [Oriented To Time, Place, And Person] : oriented to person, place, and time [Affect] : the affect was normal [Mood] : the mood was normal [No Anxiety] : not feeling anxious [Normal Rate] : normal [Rhythm Regular] : regular [No Murmur] : no murmurs heard [2+] : left 2+ [No Pitting Edema] : no pitting edema present [FreeTextEntry1] : No JVD [Rt] : no varicose veins of the right leg [Lt] : no varicose veins of the left leg

## 2021-05-27 NOTE — HISTORY OF PRESENT ILLNESS
[FreeTextEntry1] : The patient has had periods of fatigue . The patient has seen pulmonary . Unclear if he needs to go for further sleep study .

## 2021-05-27 NOTE — ASSESSMENT
[FreeTextEntry1] : The patient has increased Blood pressure . He has his Losartan increased  last week. He is allergic to sulfa making it more difficult to give him a diuretic  . He has had no chest pain . Known STEMI s/P PCI of RCA and diagonal disease not amenable to intervention .

## 2021-05-27 NOTE — REASON FOR VISIT
[Coronary Artery Disease] : coronary artery disease [Follow-Up - Clinic] : a clinic follow-up of [Carotid Artery Stenosis] : carotid stenosis [Hyperlipidemia] : hyperlipidemia [Hypertension] : hypertension [FreeTextEntry3] : Dr. Jean

## 2021-07-14 ENCOUNTER — APPOINTMENT (OUTPATIENT)
Dept: CARDIOLOGY | Facility: CLINIC | Age: 71
End: 2021-07-14

## 2021-08-11 NOTE — ASSESSMENT
[FreeTextEntry1] : The patient had an IWMI STEMI with occluded RCA . The patient had a MANASA of the RCA . The patient has reaction of SOB with Brilinta Said to have normal LV systolic function on echo . Has severe lesion in the ostium of the diagonal branch and nonobstructive disease elsewhere .  Pro BNP is mildly increased but his SOB has resolved and he appears euvoelic on exam including clear lung . LV systolic funnction is normal on Echo  I do suspect that the patient has CINTHYA and he needs to see pulmonary . 
Unable to assess due to medical condition

## 2021-08-29 ENCOUNTER — RX RENEWAL (OUTPATIENT)
Age: 71
End: 2021-08-29

## 2021-09-13 ENCOUNTER — RX RENEWAL (OUTPATIENT)
Age: 71
End: 2021-09-13

## 2021-10-12 ENCOUNTER — APPOINTMENT (OUTPATIENT)
Dept: CARDIOLOGY | Facility: CLINIC | Age: 71
End: 2021-10-12
Payer: MEDICARE

## 2021-10-12 VITALS
WEIGHT: 156 LBS | BODY MASS INDEX: 28.71 KG/M2 | TEMPERATURE: 97.3 F | HEART RATE: 66 BPM | HEIGHT: 62 IN | SYSTOLIC BLOOD PRESSURE: 126 MMHG | DIASTOLIC BLOOD PRESSURE: 70 MMHG

## 2021-10-12 PROCEDURE — 99214 OFFICE O/P EST MOD 30 MIN: CPT

## 2021-10-12 PROCEDURE — 93000 ELECTROCARDIOGRAM COMPLETE: CPT

## 2021-10-12 RX ORDER — PANTOPRAZOLE SODIUM 40 MG/1
40 TABLET, DELAYED RELEASE ORAL
Refills: 0 | Status: DISCONTINUED | COMMUNITY
End: 2021-10-12

## 2021-10-12 NOTE — ASSESSMENT
[FreeTextEntry1] : The patient has had no chest pain  He has RCA disease and branch disease of D-1  PCI of occluded RCA during STEMI  LDL is excellent . A1C is increased . He is getting a GI work up . BP is now ok . Did not have the RA Doppler He has occasional LOPEZ uncertain etiology . the patient is seeing pulmonary for PFT's / He dioes not have MI kind of pain   .

## 2021-10-12 NOTE — PHYSICAL EXAM
[General Appearance - Well Developed] : well developed [Normal Appearance] : normal appearance [Well Groomed] : well groomed [General Appearance - Well Nourished] : well nourished [No Deformities] : no deformities [General Appearance - In No Acute Distress] : no acute distress [Normal Conjunctiva] : the conjunctiva exhibited no abnormalities [Eyelids - No Xanthelasma] : the eyelids demonstrated no xanthelasmas [Normal Oral Mucosa] : normal oral mucosa [No Oral Pallor] : no oral pallor [No Oral Cyanosis] : no oral cyanosis [Respiration, Rhythm And Depth] : normal respiratory rhythm and effort [Exaggerated Use Of Accessory Muscles For Inspiration] : no accessory muscle use [Auscultation Breath Sounds / Voice Sounds] : lungs were clear to auscultation bilaterally [Abdomen Soft] : soft [Abdomen Tenderness] : non-tender [Abdomen Mass (___ Cm)] : no abdominal mass palpated [Abnormal Walk] : normal gait [Nail Clubbing] : no clubbing of the fingernails [Cyanosis, Localized] : no localized cyanosis [Petechial Hemorrhages (___cm)] : no petechial hemorrhages [Skin Color & Pigmentation] : normal skin color and pigmentation [] : no rash [No Venous Stasis] : no venous stasis [Skin Lesions] : no skin lesions [No Skin Ulcers] : no skin ulcer [No Xanthoma] : no  xanthoma was observed [Oriented To Time, Place, And Person] : oriented to person, place, and time [Affect] : the affect was normal [No Anxiety] : not feeling anxious [Mood] : the mood was normal [Normal Rate] : normal [Rhythm Regular] : regular [No Murmur] : no murmurs heard [2+] : left 2+ [No Pitting Edema] : no pitting edema present [FreeTextEntry1] : No JVD [Rt] : no varicose veins of the right leg [Lt] : no varicose veins of the left leg

## 2021-10-12 NOTE — HISTORY OF PRESENT ILLNESS
[FreeTextEntry1] : The pateint has had no further chest pain . The patient has not been eating well and has had a decreased appetite . The patient had a STEMI one year ago with STEM  with RCA occlusion and D1 80% in the ostium of the vessel. The patient has been feeling well overall  .

## 2021-10-12 NOTE — REVIEW OF SYSTEMS
[Feeling Fatigued] : feeling fatigued [Change in Appetite] : change in appetite [Constipation] : constipation [Blood in stool] : blood in stool [Joint Pain] : joint pain [Negative] : Musculoskeletal

## 2021-10-16 ENCOUNTER — LABORATORY RESULT (OUTPATIENT)
Age: 71
End: 2021-10-16

## 2021-10-16 ENCOUNTER — OUTPATIENT (OUTPATIENT)
Dept: OUTPATIENT SERVICES | Facility: HOSPITAL | Age: 71
LOS: 1 days | Discharge: HOME | End: 2021-10-16

## 2021-10-16 DIAGNOSIS — Z11.59 ENCOUNTER FOR SCREENING FOR OTHER VIRAL DISEASES: ICD-10-CM

## 2021-10-18 ENCOUNTER — APPOINTMENT (OUTPATIENT)
Age: 71
End: 2021-10-18
Payer: MEDICARE

## 2021-10-18 VITALS
HEIGHT: 62 IN | OXYGEN SATURATION: 98 % | BODY MASS INDEX: 28.52 KG/M2 | WEIGHT: 155 LBS | DIASTOLIC BLOOD PRESSURE: 76 MMHG | HEART RATE: 69 BPM | SYSTOLIC BLOOD PRESSURE: 122 MMHG | RESPIRATION RATE: 14 BRPM

## 2021-10-18 PROCEDURE — 94010 BREATHING CAPACITY TEST: CPT

## 2021-10-18 PROCEDURE — 94727 GAS DIL/WSHOT DETER LNG VOL: CPT

## 2021-10-18 PROCEDURE — 94729 DIFFUSING CAPACITY: CPT

## 2021-10-18 PROCEDURE — 99213 OFFICE O/P EST LOW 20 MIN: CPT | Mod: 25

## 2021-10-18 RX ORDER — ALBUTEROL SULFATE 90 UG/1
108 (90 BASE) AEROSOL, METERED RESPIRATORY (INHALATION)
Qty: 1 | Refills: 6 | Status: ACTIVE | COMMUNITY
Start: 2021-10-18 | End: 1900-01-01

## 2021-10-18 NOTE — DISCUSSION/SUMMARY
[FreeTextEntry1] : SOB ON EXERTION/ INTERMITTENT\par COUGH ON DEEP INSPIRATION\par ASTHMA / HYPERREACTIVE AIRWAY\par ALBUTEROL AS NEEDED\par CARDIO REVIEWED

## 2021-11-24 RX ORDER — METFORMIN HYDROCHLORIDE 1000 MG/1
1000 TABLET, COATED ORAL TWICE DAILY
Qty: 60 | Refills: 6 | Status: ACTIVE | COMMUNITY
Start: 1900-01-01 | End: 1900-01-01

## 2022-01-18 ENCOUNTER — APPOINTMENT (OUTPATIENT)
Dept: CARDIOLOGY | Facility: CLINIC | Age: 72
End: 2022-01-18
Payer: MEDICARE

## 2022-01-18 PROCEDURE — 93320 DOPPLER ECHO COMPLETE: CPT

## 2022-01-18 PROCEDURE — 93325 DOPPLER ECHO COLOR FLOW MAPG: CPT

## 2022-01-18 PROCEDURE — 93351 STRESS TTE COMPLETE: CPT

## 2022-02-27 ENCOUNTER — RX RENEWAL (OUTPATIENT)
Age: 72
End: 2022-02-27

## 2022-04-11 ENCOUNTER — APPOINTMENT (OUTPATIENT)
Age: 72
End: 2022-04-11

## 2022-04-25 NOTE — PATIENT PROFILE ADULT - INFORMATION PROVIDED TO:
patient Opioid Counseling: I discussed with the patient the potential side effects of opioids including but not limited to addiction, altered mental status, and depression. I stressed avoiding alcohol, benzodiazepines, muscle relaxants and sleep aids unless specifically okayed by a physician. The patient verbalized understanding of the proper use and possible adverse effects of opioids. All of the patient's questions and concerns were addressed. They were instructed to flush the remaining pills down the toilet if they did not need them for pain.

## 2022-05-05 ENCOUNTER — APPOINTMENT (OUTPATIENT)
Dept: CARDIOLOGY | Facility: CLINIC | Age: 72
End: 2022-05-05

## 2022-06-04 ENCOUNTER — RX RENEWAL (OUTPATIENT)
Age: 72
End: 2022-06-04

## 2022-07-20 ENCOUNTER — RX RENEWAL (OUTPATIENT)
Age: 72
End: 2022-07-20

## 2022-08-08 ENCOUNTER — APPOINTMENT (OUTPATIENT)
Dept: CARDIOLOGY | Facility: CLINIC | Age: 72
End: 2022-08-08

## 2022-08-08 VITALS
HEART RATE: 58 BPM | TEMPERATURE: 97.4 F | HEIGHT: 62 IN | BODY MASS INDEX: 27.6 KG/M2 | DIASTOLIC BLOOD PRESSURE: 70 MMHG | SYSTOLIC BLOOD PRESSURE: 138 MMHG | WEIGHT: 150 LBS

## 2022-08-08 PROCEDURE — 99214 OFFICE O/P EST MOD 30 MIN: CPT

## 2022-08-08 PROCEDURE — 93000 ELECTROCARDIOGRAM COMPLETE: CPT

## 2022-08-08 NOTE — REASON FOR VISIT
[Coronary Artery Disease] : coronary artery disease [Follow-Up - Clinic] : a clinic follow-up of [Carotid Artery Stenosis] : carotid stenosis [Hyperlipidemia] : hyperlipidemia [Hypertension] : hypertension [FreeTextEntry3] : Dr. Jean  11-Jun-2018 09:21

## 2022-08-08 NOTE — ASSESSMENT
[FreeTextEntry1] : The patient has had no chest pan ETT echo was negative for ischemia on echo images at moderate exercise load . He has history of STEMI and RCA occlusion . He has branch disease in diagonal . The patient has LDL which is at goal. Some issues of dizziness when he stands or turns his head incertain directions. He is not orthostatic on exam . Suspect that he has vertigo

## 2022-08-08 NOTE — CARDIOLOGY SUMMARY
[___] : [unfilled] [de-identified] : NSR T wave change inferolateral ischemia . 10- \par 8-8-2022 NSR LVH ST-T changes c/w ischemia vs strain pattern .  [de-identified] : 1- ETT Echo completed 6 minutes No echo ischemia . Trace MR Trace TR

## 2022-10-19 RX ORDER — ATORVASTATIN CALCIUM 80 MG/1
80 TABLET, FILM COATED ORAL
Qty: 90 | Refills: 3 | Status: ACTIVE | COMMUNITY
Start: 2022-07-20 | End: 1900-01-01

## 2022-11-13 ENCOUNTER — RX RENEWAL (OUTPATIENT)
Age: 72
End: 2022-11-13

## 2022-12-07 ENCOUNTER — APPOINTMENT (OUTPATIENT)
Dept: CARDIOLOGY | Facility: CLINIC | Age: 72
End: 2022-12-07

## 2022-12-07 VITALS
TEMPERATURE: 97.7 F | HEIGHT: 62 IN | BODY MASS INDEX: 29.26 KG/M2 | HEART RATE: 55 BPM | SYSTOLIC BLOOD PRESSURE: 138 MMHG | WEIGHT: 159 LBS | DIASTOLIC BLOOD PRESSURE: 70 MMHG

## 2022-12-07 DIAGNOSIS — I49.9 CARDIAC ARRHYTHMIA, UNSPECIFIED: ICD-10-CM

## 2022-12-07 PROCEDURE — 93000 ELECTROCARDIOGRAM COMPLETE: CPT

## 2022-12-07 PROCEDURE — 99214 OFFICE O/P EST MOD 30 MIN: CPT

## 2022-12-07 NOTE — CARDIOLOGY SUMMARY
[___] : [unfilled] [de-identified] : NSR T wave change inferolateral ischemia . 10- \par 8-8-2022 NSR LVH ST-T changes c/w ischemia vs strain pattern . \par 12-7-2022 NSR LVH NS  twave change.  [de-identified] : 1- ETT Echo completed 6 minutes No echo ischemia . Trace MR Trace TR

## 2022-12-07 NOTE — HISTORY OF PRESENT ILLNESS
[FreeTextEntry1] : The patient has had no further chest pain . The patient has not been eating well and has had a decreased appetite . The patient had a STEMI in 9-2020 STEM  with RCA occlusion and D1 80% in the ostium of the vessel. The patient has been feeling well overall  . The patient has had no chest pain .Feels lightheaded usually when standing  up from a seated position .

## 2022-12-07 NOTE — ASSESSMENT
[FreeTextEntry1] : The patient has had no chest pan ETT echo was negative for ischemia on echo images at moderate exercise load . He has history of STEMI and RCA occlusion . He has branch disease in diagonal .He has not had chest pain or SOB .

## 2023-05-10 ENCOUNTER — APPOINTMENT (OUTPATIENT)
Dept: CARDIOLOGY | Facility: CLINIC | Age: 73
End: 2023-05-10
Payer: MEDICARE

## 2023-05-10 VITALS
SYSTOLIC BLOOD PRESSURE: 130 MMHG | BODY MASS INDEX: 27.42 KG/M2 | HEIGHT: 62 IN | DIASTOLIC BLOOD PRESSURE: 70 MMHG | WEIGHT: 149 LBS

## 2023-05-10 PROCEDURE — 93000 ELECTROCARDIOGRAM COMPLETE: CPT

## 2023-05-10 PROCEDURE — 99214 OFFICE O/P EST MOD 30 MIN: CPT

## 2023-05-10 RX ORDER — PRASUGREL 10 MG/1
10 TABLET, FILM COATED ORAL
Qty: 90 | Refills: 3 | Status: ACTIVE | COMMUNITY
Start: 2021-09-13 | End: 1900-01-01

## 2023-05-10 RX ORDER — AMLODIPINE BESYLATE 5 MG/1
5 TABLET ORAL DAILY
Qty: 90 | Refills: 3 | Status: ACTIVE | COMMUNITY
Start: 2021-05-27 | End: 1900-01-01

## 2023-05-10 NOTE — CARDIOLOGY SUMMARY
[___] : [unfilled] [de-identified] : NSR T wave change inferolateral ischemia . 10- \par 8-8-2022 NSR LVH ST-T changes c/w ischemia vs strain pattern . \par 12-7-2022 NSR LVH NS  twave change. \par 5- NSR LVH NS Twave chag.e [de-identified] : 1- ETT Echo completed 6 minutes No echo ischemia . Trace MR Trace TR

## 2023-05-10 NOTE — HISTORY OF PRESENT ILLNESS
[FreeTextEntry1] : The patient has had no chest pain . The patient has had a STEMI  and RCA occlusion in 2020 . The pateint had an 80% stenosis a the ostium of the diagonal branch . The patient's LDL is is at goal. The pateint has been feeling well overall .

## 2023-05-10 NOTE — PHYSICAL EXAM
[General Appearance - Well Developed] : well developed [Normal Appearance] : normal appearance [Well Groomed] : well groomed [General Appearance - Well Nourished] : well nourished [No Deformities] : no deformities [General Appearance - In No Acute Distress] : no acute distress [Normal Conjunctiva] : the conjunctiva exhibited no abnormalities [Eyelids - No Xanthelasma] : the eyelids demonstrated no xanthelasmas [Normal Oral Mucosa] : normal oral mucosa [No Oral Pallor] : no oral pallor [No Oral Cyanosis] : no oral cyanosis [Respiration, Rhythm And Depth] : normal respiratory rhythm and effort [Exaggerated Use Of Accessory Muscles For Inspiration] : no accessory muscle use [Auscultation Breath Sounds / Voice Sounds] : lungs were clear to auscultation bilaterally [Abdomen Soft] : soft [Abdomen Tenderness] : non-tender [Abdomen Mass (___ Cm)] : no abdominal mass palpated [Abnormal Walk] : normal gait [Nail Clubbing] : no clubbing of the fingernails [Cyanosis, Localized] : no localized cyanosis [Petechial Hemorrhages (___cm)] : no petechial hemorrhages [Skin Color & Pigmentation] : normal skin color and pigmentation [] : no rash [No Venous Stasis] : no venous stasis [No Skin Ulcers] : no skin ulcer [Skin Lesions] : no skin lesions [No Xanthoma] : no  xanthoma was observed [Oriented To Time, Place, And Person] : oriented to person, place, and time [Affect] : the affect was normal [Mood] : the mood was normal [No Anxiety] : not feeling anxious [Normal Rate] : normal [Rhythm Regular] : regular [No Murmur] : no murmurs heard [2+] : left 2+ [No Pitting Edema] : no pitting edema present [FreeTextEntry1] : No JVD [Rt] : no varicose veins of the right leg [Lt] : no varicose veins of the left leg

## 2023-05-10 NOTE — ASSESSMENT
[FreeTextEntry1] : The patient has not had chest pain . HE has had a previous STEMI and had PCI and MANASA of an occluded RCA . The patient has disease of the ostium of the first diagnoal branch . The patient is depressed secondary to having his demented mother in law . LDL is at goal but DM is not controlled. The pateint was told importance of follow up with his PCP for health maintenance  and diabetes control. He has leukopenia and has seen Dr. Interiano

## 2023-05-13 ENCOUNTER — RX RENEWAL (OUTPATIENT)
Age: 73
End: 2023-05-13

## 2023-06-04 ENCOUNTER — RX RENEWAL (OUTPATIENT)
Age: 73
End: 2023-06-04

## 2023-09-12 ENCOUNTER — APPOINTMENT (OUTPATIENT)
Dept: CARDIOLOGY | Facility: CLINIC | Age: 73
End: 2023-09-12
Payer: MEDICARE

## 2023-09-12 PROCEDURE — 93880 EXTRACRANIAL BILAT STUDY: CPT

## 2023-09-21 ENCOUNTER — APPOINTMENT (OUTPATIENT)
Dept: CARDIOLOGY | Facility: CLINIC | Age: 73
End: 2023-09-21
Payer: MEDICARE

## 2023-09-21 VITALS
SYSTOLIC BLOOD PRESSURE: 148 MMHG | WEIGHT: 158 LBS | HEART RATE: 61 BPM | DIASTOLIC BLOOD PRESSURE: 80 MMHG | HEIGHT: 62 IN | BODY MASS INDEX: 29.08 KG/M2

## 2023-09-21 VITALS — DIASTOLIC BLOOD PRESSURE: 80 MMHG | SYSTOLIC BLOOD PRESSURE: 160 MMHG

## 2023-09-21 DIAGNOSIS — E11.9 TYPE 2 DIABETES MELLITUS W/OUT COMPLICATIONS: ICD-10-CM

## 2023-09-21 PROCEDURE — 93000 ELECTROCARDIOGRAM COMPLETE: CPT

## 2023-09-21 PROCEDURE — 99214 OFFICE O/P EST MOD 30 MIN: CPT

## 2023-11-05 ENCOUNTER — RX RENEWAL (OUTPATIENT)
Age: 73
End: 2023-11-05

## 2023-11-05 RX ORDER — PANTOPRAZOLE 40 MG/1
40 TABLET, DELAYED RELEASE ORAL
Qty: 90 | Refills: 3 | Status: ACTIVE | COMMUNITY
Start: 2021-08-29 | End: 1900-01-01

## 2023-11-16 ENCOUNTER — APPOINTMENT (OUTPATIENT)
Dept: CARDIOLOGY | Facility: CLINIC | Age: 73
End: 2023-11-16
Payer: MEDICARE

## 2023-11-16 VITALS — SYSTOLIC BLOOD PRESSURE: 140 MMHG | HEART RATE: 64 BPM | DIASTOLIC BLOOD PRESSURE: 78 MMHG

## 2023-11-16 VITALS — WEIGHT: 153 LBS | BODY MASS INDEX: 27.98 KG/M2

## 2023-11-16 VITALS — HEIGHT: 62 IN

## 2023-11-16 DIAGNOSIS — J45.909 UNSPECIFIED ASTHMA, UNCOMPLICATED: ICD-10-CM

## 2023-11-16 DIAGNOSIS — R06.02 SHORTNESS OF BREATH: ICD-10-CM

## 2023-11-16 DIAGNOSIS — E04.2 NONTOXIC MULTINODULAR GOITER: ICD-10-CM

## 2023-11-16 DIAGNOSIS — Z00.00 ENCOUNTER FOR GENERAL ADULT MEDICAL EXAMINATION W/OUT ABNORMAL FINDINGS: ICD-10-CM

## 2023-11-16 PROCEDURE — 99214 OFFICE O/P EST MOD 30 MIN: CPT

## 2023-11-16 PROCEDURE — 93000 ELECTROCARDIOGRAM COMPLETE: CPT

## 2024-02-16 ENCOUNTER — EMERGENCY (EMERGENCY)
Facility: HOSPITAL | Age: 74
LOS: 0 days | Discharge: HOME | End: 2024-02-16
Attending: EMERGENCY MEDICINE
Payer: MEDICARE

## 2024-02-16 VITALS
OXYGEN SATURATION: 98 % | SYSTOLIC BLOOD PRESSURE: 151 MMHG | WEIGHT: 154.98 LBS | HEART RATE: 77 BPM | TEMPERATURE: 98 F | DIASTOLIC BLOOD PRESSURE: 74 MMHG | RESPIRATION RATE: 18 BRPM | HEIGHT: 62 IN

## 2024-02-16 DIAGNOSIS — H55.09 OTHER FORMS OF NYSTAGMUS: ICD-10-CM

## 2024-02-16 DIAGNOSIS — Z79.84 LONG TERM (CURRENT) USE OF ORAL HYPOGLYCEMIC DRUGS: ICD-10-CM

## 2024-02-16 DIAGNOSIS — E11.9 TYPE 2 DIABETES MELLITUS WITHOUT COMPLICATIONS: ICD-10-CM

## 2024-02-16 DIAGNOSIS — K21.9 GASTRO-ESOPHAGEAL REFLUX DISEASE WITHOUT ESOPHAGITIS: ICD-10-CM

## 2024-02-16 DIAGNOSIS — Z95.5 PRESENCE OF CORONARY ANGIOPLASTY IMPLANT AND GRAFT: ICD-10-CM

## 2024-02-16 DIAGNOSIS — R42 DIZZINESS AND GIDDINESS: ICD-10-CM

## 2024-02-16 DIAGNOSIS — Z79.02 LONG TERM (CURRENT) USE OF ANTITHROMBOTICS/ANTIPLATELETS: ICD-10-CM

## 2024-02-16 DIAGNOSIS — Z88.2 ALLERGY STATUS TO SULFONAMIDES: ICD-10-CM

## 2024-02-16 DIAGNOSIS — I48.91 UNSPECIFIED ATRIAL FIBRILLATION: ICD-10-CM

## 2024-02-16 DIAGNOSIS — I10 ESSENTIAL (PRIMARY) HYPERTENSION: ICD-10-CM

## 2024-02-16 LAB
ALBUMIN SERPL ELPH-MCNC: 4.6 G/DL — SIGNIFICANT CHANGE UP (ref 3.5–5.2)
ALP SERPL-CCNC: 63 U/L — SIGNIFICANT CHANGE UP (ref 30–115)
ALT FLD-CCNC: 22 U/L — SIGNIFICANT CHANGE UP (ref 0–41)
ANION GAP SERPL CALC-SCNC: 15 MMOL/L — HIGH (ref 7–14)
APPEARANCE UR: CLEAR — SIGNIFICANT CHANGE UP
AST SERPL-CCNC: 26 U/L — SIGNIFICANT CHANGE UP (ref 0–41)
B-OH-BUTYR SERPL-SCNC: 0.2 MMOL/L — SIGNIFICANT CHANGE UP
BASE EXCESS BLDV CALC-SCNC: 1.2 MMOL/L — SIGNIFICANT CHANGE UP (ref -2–3)
BASOPHILS # BLD AUTO: 0.02 K/UL — SIGNIFICANT CHANGE UP (ref 0–0.2)
BASOPHILS NFR BLD AUTO: 0.6 % — SIGNIFICANT CHANGE UP (ref 0–1)
BILIRUB SERPL-MCNC: 1 MG/DL — SIGNIFICANT CHANGE UP (ref 0.2–1.2)
BILIRUB UR-MCNC: NEGATIVE — SIGNIFICANT CHANGE UP
BUN SERPL-MCNC: 12 MG/DL — SIGNIFICANT CHANGE UP (ref 10–20)
CA-I SERPL-SCNC: 1.16 MMOL/L — SIGNIFICANT CHANGE UP (ref 1.15–1.33)
CALCIUM SERPL-MCNC: 9.2 MG/DL — SIGNIFICANT CHANGE UP (ref 8.4–10.5)
CHLORIDE SERPL-SCNC: 103 MMOL/L — SIGNIFICANT CHANGE UP (ref 98–110)
CO2 SERPL-SCNC: 22 MMOL/L — SIGNIFICANT CHANGE UP (ref 17–32)
COLOR SPEC: YELLOW — SIGNIFICANT CHANGE UP
CREAT SERPL-MCNC: 0.8 MG/DL — SIGNIFICANT CHANGE UP (ref 0.7–1.5)
DIFF PNL FLD: NEGATIVE — SIGNIFICANT CHANGE UP
EGFR: 94 ML/MIN/1.73M2 — SIGNIFICANT CHANGE UP
EOSINOPHIL # BLD AUTO: 0.02 K/UL — SIGNIFICANT CHANGE UP (ref 0–0.7)
EOSINOPHIL NFR BLD AUTO: 0.6 % — SIGNIFICANT CHANGE UP (ref 0–8)
GAS PNL BLDV: 137 MMOL/L — SIGNIFICANT CHANGE UP (ref 136–145)
GAS PNL BLDV: SIGNIFICANT CHANGE UP
GLUCOSE SERPL-MCNC: 221 MG/DL — HIGH (ref 70–99)
GLUCOSE UR QL: 500 MG/DL
HCO3 BLDV-SCNC: 28 MMOL/L — SIGNIFICANT CHANGE UP (ref 22–29)
HCT VFR BLD CALC: 41.7 % — LOW (ref 42–52)
HCT VFR BLDA CALC: 41 % — SIGNIFICANT CHANGE UP (ref 39–51)
HGB BLD CALC-MCNC: 13.7 G/DL — SIGNIFICANT CHANGE UP (ref 12.6–17.4)
HGB BLD-MCNC: 14.1 G/DL — SIGNIFICANT CHANGE UP (ref 14–18)
IMM GRANULOCYTES NFR BLD AUTO: 0.3 % — SIGNIFICANT CHANGE UP (ref 0.1–0.3)
KETONES UR-MCNC: ABNORMAL MG/DL
LACTATE BLDV-MCNC: 1.3 MMOL/L — SIGNIFICANT CHANGE UP (ref 0.5–2)
LEUKOCYTE ESTERASE UR-ACNC: NEGATIVE — SIGNIFICANT CHANGE UP
LYMPHOCYTES # BLD AUTO: 0.98 K/UL — LOW (ref 1.2–3.4)
LYMPHOCYTES # BLD AUTO: 29 % — SIGNIFICANT CHANGE UP (ref 20.5–51.1)
MCHC RBC-ENTMCNC: 32.9 PG — HIGH (ref 27–31)
MCHC RBC-ENTMCNC: 33.8 G/DL — SIGNIFICANT CHANGE UP (ref 32–37)
MCV RBC AUTO: 97.2 FL — HIGH (ref 80–94)
MONOCYTES # BLD AUTO: 0.34 K/UL — SIGNIFICANT CHANGE UP (ref 0.1–0.6)
MONOCYTES NFR BLD AUTO: 10.1 % — HIGH (ref 1.7–9.3)
NEUTROPHILS # BLD AUTO: 2.01 K/UL — SIGNIFICANT CHANGE UP (ref 1.4–6.5)
NEUTROPHILS NFR BLD AUTO: 59.4 % — SIGNIFICANT CHANGE UP (ref 42.2–75.2)
NITRITE UR-MCNC: NEGATIVE — SIGNIFICANT CHANGE UP
NRBC # BLD: 0 /100 WBCS — SIGNIFICANT CHANGE UP (ref 0–0)
PCO2 BLDV: 50 MMHG — SIGNIFICANT CHANGE UP (ref 42–55)
PH BLDV: 7.35 — SIGNIFICANT CHANGE UP (ref 7.32–7.43)
PH UR: 6 — SIGNIFICANT CHANGE UP (ref 5–8)
PLATELET # BLD AUTO: 206 K/UL — SIGNIFICANT CHANGE UP (ref 130–400)
PMV BLD: 10 FL — SIGNIFICANT CHANGE UP (ref 7.4–10.4)
PO2 BLDV: 21 MMHG — LOW (ref 25–45)
POTASSIUM BLDV-SCNC: 4.3 MMOL/L — SIGNIFICANT CHANGE UP (ref 3.5–5.1)
POTASSIUM SERPL-MCNC: 4.8 MMOL/L — SIGNIFICANT CHANGE UP (ref 3.5–5)
POTASSIUM SERPL-SCNC: 4.8 MMOL/L — SIGNIFICANT CHANGE UP (ref 3.5–5)
PROT SERPL-MCNC: 7.2 G/DL — SIGNIFICANT CHANGE UP (ref 6–8)
PROT UR-MCNC: NEGATIVE MG/DL — SIGNIFICANT CHANGE UP
RBC # BLD: 4.29 M/UL — LOW (ref 4.7–6.1)
RBC # FLD: 11.3 % — LOW (ref 11.5–14.5)
SAO2 % BLDV: 25.8 % — LOW (ref 67–88)
SODIUM SERPL-SCNC: 140 MMOL/L — SIGNIFICANT CHANGE UP (ref 135–146)
SP GR SPEC: 1.01 — SIGNIFICANT CHANGE UP (ref 1–1.03)
TROPONIN T, HIGH SENSITIVITY RESULT: 10 NG/L — SIGNIFICANT CHANGE UP (ref 6–21)
TROPONIN T, HIGH SENSITIVITY RESULT: 11 NG/L — SIGNIFICANT CHANGE UP (ref 6–21)
UROBILINOGEN FLD QL: 0.2 MG/DL — SIGNIFICANT CHANGE UP (ref 0.2–1)
WBC # BLD: 3.38 K/UL — LOW (ref 4.8–10.8)
WBC # FLD AUTO: 3.38 K/UL — LOW (ref 4.8–10.8)

## 2024-02-16 PROCEDURE — 81003 URINALYSIS AUTO W/O SCOPE: CPT

## 2024-02-16 PROCEDURE — 71045 X-RAY EXAM CHEST 1 VIEW: CPT

## 2024-02-16 PROCEDURE — 85018 HEMOGLOBIN: CPT

## 2024-02-16 PROCEDURE — 82330 ASSAY OF CALCIUM: CPT

## 2024-02-16 PROCEDURE — 96374 THER/PROPH/DIAG INJ IV PUSH: CPT

## 2024-02-16 PROCEDURE — 85014 HEMATOCRIT: CPT

## 2024-02-16 PROCEDURE — 93010 ELECTROCARDIOGRAM REPORT: CPT

## 2024-02-16 PROCEDURE — 80053 COMPREHEN METABOLIC PANEL: CPT

## 2024-02-16 PROCEDURE — 83605 ASSAY OF LACTIC ACID: CPT

## 2024-02-16 PROCEDURE — 82803 BLOOD GASES ANY COMBINATION: CPT

## 2024-02-16 PROCEDURE — 93005 ELECTROCARDIOGRAM TRACING: CPT

## 2024-02-16 PROCEDURE — 84295 ASSAY OF SERUM SODIUM: CPT

## 2024-02-16 PROCEDURE — 84484 ASSAY OF TROPONIN QUANT: CPT | Mod: 59

## 2024-02-16 PROCEDURE — 82962 GLUCOSE BLOOD TEST: CPT

## 2024-02-16 PROCEDURE — 99284 EMERGENCY DEPT VISIT MOD MDM: CPT

## 2024-02-16 PROCEDURE — 70450 CT HEAD/BRAIN W/O DYE: CPT | Mod: 26,MA

## 2024-02-16 PROCEDURE — 82010 KETONE BODYS QUAN: CPT

## 2024-02-16 PROCEDURE — 71045 X-RAY EXAM CHEST 1 VIEW: CPT | Mod: 26

## 2024-02-16 PROCEDURE — 36415 COLL VENOUS BLD VENIPUNCTURE: CPT

## 2024-02-16 PROCEDURE — 85025 COMPLETE CBC W/AUTO DIFF WBC: CPT

## 2024-02-16 PROCEDURE — 99285 EMERGENCY DEPT VISIT HI MDM: CPT | Mod: 25

## 2024-02-16 PROCEDURE — 70450 CT HEAD/BRAIN W/O DYE: CPT | Mod: MA

## 2024-02-16 PROCEDURE — 84132 ASSAY OF SERUM POTASSIUM: CPT

## 2024-02-16 RX ORDER — MECLIZINE HCL 12.5 MG
1 TABLET ORAL
Qty: 10 | Refills: 0
Start: 2024-02-16 | End: 2024-02-20

## 2024-02-16 RX ORDER — MECLIZINE HCL 12.5 MG
50 TABLET ORAL ONCE
Refills: 0 | Status: COMPLETED | OUTPATIENT
Start: 2024-02-16 | End: 2024-02-16

## 2024-02-16 RX ORDER — SODIUM CHLORIDE 9 MG/ML
1000 INJECTION INTRAMUSCULAR; INTRAVENOUS; SUBCUTANEOUS ONCE
Refills: 0 | Status: COMPLETED | OUTPATIENT
Start: 2024-02-16 | End: 2024-02-16

## 2024-02-16 RX ORDER — METFORMIN HYDROCHLORIDE 850 MG/1
1 TABLET ORAL
Qty: 60 | Refills: 0
Start: 2024-02-16 | End: 2024-03-16

## 2024-02-16 RX ORDER — HYOSCYAMINE SULFATE 0.13 MG
1 TABLET ORAL
Qty: 30 | Refills: 0
Start: 2024-02-16 | End: 2024-03-16

## 2024-02-16 RX ORDER — ONDANSETRON 8 MG/1
4 TABLET, FILM COATED ORAL ONCE
Refills: 0 | Status: COMPLETED | OUTPATIENT
Start: 2024-02-16 | End: 2024-02-16

## 2024-02-16 RX ADMIN — Medication 50 MILLIGRAM(S): at 18:38

## 2024-02-16 RX ADMIN — SODIUM CHLORIDE 1000 MILLILITER(S): 9 INJECTION INTRAMUSCULAR; INTRAVENOUS; SUBCUTANEOUS at 18:40

## 2024-02-16 RX ADMIN — ONDANSETRON 4 MILLIGRAM(S): 8 TABLET, FILM COATED ORAL at 18:38

## 2024-02-16 NOTE — ED PROVIDER NOTE - IMAGING STUDIES QUESTION 1 - DISCUSSED RESULTS WITH RADIOLOGIST
Render Note In Bullet Format When Appropriate: No Show Applicator Variable?: Yes Number Of Freeze-Thaw Cycles: 1 freeze-thaw cycle Detail Level: Detailed Duration Of Freeze Thaw-Cycle (Seconds): 0 Post-Care Instructions: I reviewed with the patient in detail post-care instructions. Patient is to wear sunprotection, and avoid picking at any of the treated lesions. Pt may apply Vaseline to crusted or scabbing areas.  May apply bandaid if desired. Consent: The patient's consent was obtained including but not limited to risks of crusting, scabbing, blistering, scarring, darker or lighter pigmentary change, recurrence, incomplete removal and infection. Yes

## 2024-02-16 NOTE — ED ADULT NURSE NOTE - PAIN: PRESENCE, MLM
Normal vision: sees adequately in most situations; can see medication labels, newsprint complains of pain/discomfort

## 2024-02-16 NOTE — ED PROVIDER NOTE - CLINICAL SUMMARY MEDICAL DECISION MAKING FREE TEXT BOX
73 y.o. male, PMH of DM on metformin, GERD, diverticulitis, hypertension, history of cardiac stent in 2020, A-fib on Prasugrel presenting for evaluation of dizziness and lightheadedness over the last 2 weeks.  Patient states that dizziness is described as room spinning, worse when he sits up quickly or stands up quickly from a seated position.  Patient denies any falls or head trauma.  Patient's brother at bedside notes that he has been under significant stress and is not compliant with his medications.  Patient notes he takes his medicine occasionally.  He notes his blood pressure has been elevated over the last 2 days, systolic blood pressure in the 140s.  He also notes that his sugars have been out of control.  Fingerstick in triage in the 280s. Patient denies fevers, chills, chest pain, shortness of breath, abdominal pain, vomiting. On exam, pt in NAD, AAOx3, head NC/AT, CN II-XII intact, PEERL, EOMi, (+) left horizontal nystagmus, neck (-) midline tenderness, lungs CTA B/L, CV S1S2 regular, abdomen soft/NT/ND/(+)BS, ext (-) edema, motor 5/5x4, sensation intact, ambulating with steady gait. Labs reviewed. Pt feels better after meds/IVF. Will d/c with PMD follow up. Strict return precautions provided.

## 2024-02-16 NOTE — ED ADULT NURSE NOTE - NSFALLHARMRISKINTERV_ED_ALL_ED

## 2024-02-16 NOTE — ED PROVIDER NOTE - PROGRESS NOTE DETAILS
ss Reviewed labs and imaging with patient and brothers at bedside. Patient requesting refill of some home meds. Strict return precautions given. Patient to be discharged from ED. Any available test results were discussed with patient and/or family. Verbal instructions given, including instructions to return to ED immediately for any new, worsening, or concerning symptoms. Patient endorsed understanding. Written discharge instructions additionally given, including follow-up plan.

## 2024-02-16 NOTE — ED PROVIDER NOTE - NSFOLLOWUPINSTRUCTIONS_ED_ALL_ED_FT
Vertigo  Vertigo is the feeling that you or your surroundings are moving when they are not. Vertigo can be dangerous if it occurs while you are doing something that could endanger you or others, such as driving.    What are the causes?  This condition is caused by a disturbance in the signals that are sent by your body’s sensory systems to your brain. Different causes of a disturbance can lead to vertigo, including:    Infections, especially in the inner ear.  A bad reaction to a drug, or misuse of alcohol and medicines.  Withdrawal from drugs or alcohol.  Quickly changing positions, as when lying down or rolling over in bed.  Migraine headaches.  Decreased blood flow to the brain.  Decreased blood pressure.  Increased pressure in the brain from a head or neck injury, stroke, infection, tumor, or bleeding.  Central nervous system disorders.    What are the signs or symptoms?  Symptoms of this condition usually occur when you move your head or your eyes in different directions. Symptoms may start suddenly, and they usually last for less than a minute. Symptoms may include:    Loss of balance and falling.  Feeling like you are spinning or moving.  Feeling like your surroundings are spinning or moving.  Nausea and vomiting.  Blurred vision or double vision.  Difficulty hearing.  Slurred speech.  Dizziness.  Involuntary eye movement (nystagmus).    Symptoms can be mild and cause only slight annoyance, or they can be severe and interfere with daily life. Episodes of vertigo may return (recur) over time, and they are often triggered by certain movements. Symptoms may improve over time.    How is this diagnosed?  This condition may be diagnosed based on medical history and the quality of your nystagmus. Your health care provider may test your eye movements by asking you to quickly change positions to trigger the nystagmus. This may be called the Fabricio-Hallpike test, head thrust test, or roll test. You may be referred to a health care provider who specializes in ear, nose, and throat (ENT) problems (otolaryngologist) or a provider who specializes in disorders of the central nervous system (neurologist).    You may have additional testing, including:    A physical exam.  Blood tests.  MRI.  A CT scan.  An electrocardiogram (ECG). This records electrical activity in your heart.  An electroencephalogram (EEG). This records electrical activity in your brain.  Hearing tests.    How is this treated?  Treatment for this condition depends on the cause and the severity of the symptoms. Treatment options include:    Medicines to treat nausea or vertigo. These are usually used for severe cases. Some medicines that are used to treat other conditions may also reduce or eliminate vertigo symptoms. These include:    Medicines that control allergies (antihistamines).  Medicines that control seizures (anticonvulsants).  Medicines that relieve depression (antidepressants).  Medicines that relieve anxiety (sedatives).    Head movements to adjust your inner ear back to normal. If your vertigo is caused by an ear problem, your health care provider may recommend certain movements to correct the problem.  Surgery. This is rare.    Follow these instructions at home:  Safety     Move slowly.Avoid sudden body or head movements.  Avoid driving.  Avoid operating heavy machinery.  Avoid doing any tasks that would cause danger to you or others if you would have a vertigo episode during the task.  If you have trouble walking or keeping your balance, try using a cane for stability. If you feel dizzy or unstable, sit down right away.  Return to your normal activities as told by your health care provider. Ask your health care provider what activities are safe for you.  General instructions     Take over-the-counter and prescription medicines only as told by your health care provider.  Avoid certain positions or movements as told by your health care provider.  Drink enough fluid to keep your urine clear or pale yellow.  Keep all follow-up visits as told by your health care provider. This is important.  Contact a health care provider if:  Your medicines do not relieve your vertigo or they make it worse.  You have a fever.  Your condition gets worse or you develop new symptoms.  Your family or friends notice any behavioral changes.  Your nausea or vomiting gets worse.  You have numbness or a “pins and needles” sensation in part of your body.  Get help right away if:  You have difficulty moving or speaking.  You are always dizzy.  You faint.  You develop severe headaches.  You have weakness in your hands, arms, or legs.  You have changes in your hearing or vision.  You develop a stiff neck.  You develop sensitivity to light.

## 2024-02-16 NOTE — ED PROVIDER NOTE - OBJECTIVE STATEMENT
Patient is a 73-year-old male with past medical history of DM on metformin, GERD, diverticulitis, hypertension, history of cardiac stent in 2020, A-fib on Prasugrel presenting for evaluation of dizziness and lightheadedness over the last 2 weeks.  Patient states that dizziness is described as room spinning worse when he sits up quickly or stands up quickly from a seated position.  Patient denies any falls or head trauma.  Patient's brother at bedside notes that he has been under significant stress and is not compliant with his medications.  Patient notes he takes his medicine occasionally.  He notes his blood pressure has been elevated over the last 2 days, systolic blood pressure in the 140s.  He also notes that his sugars have been out of control.  Fingerstick in triage in the 280s. Patient is a 73-year-old male with past medical history of DM on metformin, GERD, diverticulitis, hypertension, history of cardiac stent in 2020, A-fib on Prasugrel presenting for evaluation of dizziness and lightheadedness over the last 2 weeks.  Patient states that dizziness is described as room spinning worse when he sits up quickly or stands up quickly from a seated position.  Patient denies any falls or head trauma.  Patient's brother at bedside notes that he has been under significant stress and is not compliant with his medications.  Patient notes he takes his medicine occasionally.  He notes his blood pressure has been elevated over the last 2 days, systolic blood pressure in the 140s.  He also notes that his sugars have been out of control.  Fingerstick in triage in the 280s. Patient denies fevers, chills, chest pain, shortness of breath, abdominal pain, vomiting.

## 2024-02-16 NOTE — ED PROVIDER NOTE - PHYSICAL EXAMINATION
VITAL SIGNS: I have reviewed nursing notes and confirm.  CONSTITUTIONAL: well-appearing, non-toxic, NAD  SKIN: Warm dry, normal skin turgor  HEAD: NCAT  EYES: EOMI, PERRLA, no scleral icterus. (+) left beating nystagmus.  ENT: Moist mucous membranes, normal pharynx with no erythema or exudates  NECK: Supple; non tender. Full ROM.  CARD: RRR, no murmurs, rubs or gallops  RESP: clear to ausculation b/l.  No rales, rhonchi, or wheezing.  ABD: soft, non-tender, non-distended, no rebound or guarding.   EXT: Full ROM, no bony tenderness, no pedal edema, no calf tenderness  NEURO: normal motor. normal sensory. CN II-XII intact. No facial asymmetry. No pronator drift. Normal gait.  PSYCH: Cooperative, appropriate.

## 2024-02-16 NOTE — ED PROVIDER NOTE - PATIENT PORTAL LINK FT
You can access the FollowMyHealth Patient Portal offered by [facility] by registering at the following website: [email or web address]. By joining [facility]’s FollowMyHealth portal, you will also be able to view your health information using other applications (apps) compatible with our system.

## 2024-02-16 NOTE — ED ADULT NURSE NOTE - OBJECTIVE STATEMENT
Pt with C/O chest pain on and off for 2 weeks worse today with dizziness weakness ,denies fever or chills ,lenka N/V/D.

## 2024-02-16 NOTE — ED PROVIDER NOTE - NSFOLLOWUPCLINICS_GEN_ALL_ED_FT
[facility]  ENT  [address], [address] [place] [address]  Phone: [phone]  Fax:   Follow Up Time: 1-3 Days

## 2024-03-07 ENCOUNTER — APPOINTMENT (OUTPATIENT)
Dept: CARDIOLOGY | Facility: CLINIC | Age: 74
End: 2024-03-07
Payer: MEDICARE

## 2024-03-07 VITALS — TEMPERATURE: 97.6 F | DIASTOLIC BLOOD PRESSURE: 64 MMHG | SYSTOLIC BLOOD PRESSURE: 122 MMHG

## 2024-03-07 VITALS — HEIGHT: 62 IN | WEIGHT: 159 LBS | BODY MASS INDEX: 29.26 KG/M2

## 2024-03-07 VITALS — HEART RATE: 64 BPM

## 2024-03-07 DIAGNOSIS — D72.819 DECREASED WHITE BLOOD CELL COUNT, UNSPECIFIED: ICD-10-CM

## 2024-03-07 PROCEDURE — 99214 OFFICE O/P EST MOD 30 MIN: CPT

## 2024-03-07 PROCEDURE — 93242 EXT ECG>48HR<7D RECORDING: CPT

## 2024-03-07 PROCEDURE — 93000 ELECTROCARDIOGRAM COMPLETE: CPT

## 2024-03-07 NOTE — REVIEW OF SYSTEMS
[Feeling Fatigued] : feeling fatigued [Constipation] : constipation [Change in Appetite] : change in appetite [Joint Pain] : joint pain [Blood in stool] : blood in stool [Negative] : Musculoskeletal

## 2024-03-17 NOTE — REASON FOR VISIT
[Coronary Artery Disease] : coronary artery disease [Carotid Artery Stenosis] : carotid stenosis [Follow-Up - Clinic] : a clinic follow-up of [Hyperlipidemia] : hyperlipidemia [Hypertension] : hypertension [FreeTextEntry3] : Dr. Jean

## 2024-03-17 NOTE — HISTORY OF PRESENT ILLNESS
[FreeTextEntry1] : The patient has had no chest pain . The patient has had a STEMI  and RCA occlusion and PCI of RCA  in 2020 . The patient had an 80% stenosis a the ostium of the diagonal branch . The patient's LDL is is at goal. The pateint has been feeling well overall . The patient had no significant carotid disease he does however have a left sided thyroid nodule. He is now seeing his PCP for this  He did not see endo . The patient had vertigo and was in the R last month   He has not had chest pain .

## 2024-03-17 NOTE — ASSESSMENT
[FreeTextEntry1] : The patient has not had chest pain. HE has had a previous STEMI and had PCI and MANASA of an occluded RCA. 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 The patient is depressed secondary to having his demented mother in law. The patient had a carotid doppler which showed no significant carotid artery disease.. He does have a left sided thyroid nodule.  He has not seen endocrine . . He has apparent BPPV  and this seems to be the cause of this .

## 2024-03-17 NOTE — CARDIOLOGY SUMMARY
[___] : [unfilled] [de-identified] : NSR T wave change inferolateral ischemia . 10-  8-8-2022 NSR LVH ST-T changes c/w ischemia vs strain pattern .  12-7-2022 NSR LVH NS  twave change.  11-1-2023 NSR LVH NS  twave change  9- NSR NS  twave change  5- NSR LVH NS Twave chag.e 3-7-2024 NSR NS  twave change  [de-identified] : 1- ETT Echo completed 6 minutes No echo ischemia . Trace MR Trace TR

## 2024-03-21 ENCOUNTER — APPOINTMENT (OUTPATIENT)
Dept: CARDIOLOGY | Facility: CLINIC | Age: 74
End: 2024-03-21
Payer: MEDICARE

## 2024-03-21 DIAGNOSIS — E78.5 HYPERLIPIDEMIA, UNSPECIFIED: ICD-10-CM

## 2024-03-21 DIAGNOSIS — I49.3 VENTRICULAR PREMATURE DEPOLARIZATION: ICD-10-CM

## 2024-03-21 DIAGNOSIS — I25.10 ATHEROSCLEROTIC HEART DISEASE OF NATIVE CORONARY ARTERY W/OUT ANGINA PECTORIS: ICD-10-CM

## 2024-03-21 DIAGNOSIS — G47.33 OBSTRUCTIVE SLEEP APNEA (ADULT) (PEDIATRIC): ICD-10-CM

## 2024-03-21 DIAGNOSIS — I10 ESSENTIAL (PRIMARY) HYPERTENSION: ICD-10-CM

## 2024-03-21 PROCEDURE — 93244 EXT ECG>48HR<7D REV&INTERPJ: CPT

## 2024-04-04 ENCOUNTER — APPOINTMENT (OUTPATIENT)
Dept: OTOLARYNGOLOGY | Facility: CLINIC | Age: 74
End: 2024-04-04
Payer: MEDICARE

## 2024-04-04 DIAGNOSIS — R49.0 DYSPHONIA: ICD-10-CM

## 2024-04-04 DIAGNOSIS — R42 DIZZINESS AND GIDDINESS: ICD-10-CM

## 2024-04-04 DIAGNOSIS — K21.9 GASTRO-ESOPHAGEAL REFLUX DISEASE W/OUT ESOPHAGITIS: ICD-10-CM

## 2024-04-04 DIAGNOSIS — H90.5 UNSPECIFIED SENSORINEURAL HEARING LOSS: ICD-10-CM

## 2024-04-04 DIAGNOSIS — H91.93 UNSPECIFIED HEARING LOSS, BILATERAL: ICD-10-CM

## 2024-04-04 PROCEDURE — 31575 DIAGNOSTIC LARYNGOSCOPY: CPT | Mod: 52

## 2024-04-04 PROCEDURE — 99204 OFFICE O/P NEW MOD 45 MIN: CPT | Mod: 25

## 2024-04-04 PROCEDURE — 92557 COMPREHENSIVE HEARING TEST: CPT

## 2024-04-04 PROCEDURE — 92550 TYMPANOMETRY & REFLEX THRESH: CPT | Mod: 52

## 2024-04-04 RX ORDER — FAMOTIDINE 40 MG/1
40 TABLET, FILM COATED ORAL
Qty: 30 | Refills: 3 | Status: ACTIVE | COMMUNITY
Start: 2024-04-04 | End: 1900-01-01

## 2024-04-04 NOTE — DATA REVIEWED
[de-identified] :  reviewed and interpreted by me. B/L DARRYN [de-identified] : Test was reviewed  and interpreted by me. See official report below. CT BRAIN ORDERED BY: ROBB WATKINS  PROCEDURE DATE: 02/16/2024    INTERPRETATION: CLINICAL HISTORY: Dizziness.  COMPARISON: None.  TECHNIQUE: Images were obtained from the skull base to the vertex without the administration of intravenous contrast. Sagittal and coronal reformatted images were also obtained.  FINDINGS:  Ventricles and sulci are age-appropriate. Foci of low attenuation in the periventricular and subcortical white matter most consistent with chronic small vessel ischemic change.  No intracranial hemorrhage, mass effect, or midline shift. No intra- or extra-axial fluid collections identified. Basilar cisterns patent. Gray/white differentiation is well preserved.  Calvarium and visualized orbits are unremarkable. Included paranasal sinuses are clear.  IMPRESSION:  1. No acute intracranial abnormalities. 2. Mild chronic microvascular ischemic change.

## 2024-04-04 NOTE — HISTORY OF PRESENT ILLNESS
[de-identified] : Patient presents  today for c/o dizziness. He states he feels dizziness randomly once or twice a year that lasts for only a few seconds at a time. Last episode occurred 6-7 months ago. Reports room spinning, imbalance, and lightheadedness. He states that he has some trouble hearing television on low volume. Asks others to repeat themselves at times. Hears whooshing sound occasionally as well.  He also has a history of reflux and would like his throat to be looked at. Has phlegm in throat, hoarseness at times, sight cough. Hx diverticulitis, IBS. Takes pantoprazole. No further complaints.

## 2024-05-07 ENCOUNTER — RX RENEWAL (OUTPATIENT)
Age: 74
End: 2024-05-07

## 2024-05-07 RX ORDER — METOPROLOL SUCCINATE 25 MG/1
25 TABLET, EXTENDED RELEASE ORAL
Qty: 90 | Refills: 3 | Status: ACTIVE | COMMUNITY
Start: 2022-02-27 | End: 1900-01-01

## 2024-05-28 ENCOUNTER — RX RENEWAL (OUTPATIENT)
Age: 74
End: 2024-05-28

## 2024-05-28 RX ORDER — LOSARTAN POTASSIUM 50 MG/1
50 TABLET, FILM COATED ORAL
Qty: 90 | Refills: 3 | Status: ACTIVE | COMMUNITY
Start: 2022-06-04 | End: 1900-01-01

## 2024-07-12 ENCOUNTER — APPOINTMENT (OUTPATIENT)
Dept: CARDIOLOGY | Facility: CLINIC | Age: 74
End: 2024-07-12

## 2024-08-17 ENCOUNTER — RX RENEWAL (OUTPATIENT)
Age: 74
End: 2024-08-17

## 2024-09-21 ENCOUNTER — RX RENEWAL (OUTPATIENT)
Age: 74
End: 2024-09-21

## 2024-10-21 ENCOUNTER — RX RENEWAL (OUTPATIENT)
Age: 74
End: 2024-10-21

## 2025-05-05 ENCOUNTER — NON-APPOINTMENT (OUTPATIENT)
Age: 75
End: 2025-05-05

## 2025-05-05 ENCOUNTER — APPOINTMENT (OUTPATIENT)
Dept: CARDIOLOGY | Facility: CLINIC | Age: 75
End: 2025-05-05
Payer: MEDICARE

## 2025-05-05 VITALS — DIASTOLIC BLOOD PRESSURE: 78 MMHG | SYSTOLIC BLOOD PRESSURE: 120 MMHG | HEART RATE: 57 BPM

## 2025-05-05 VITALS — TEMPERATURE: 97.6 F | WEIGHT: 162 LBS | HEIGHT: 62 IN | BODY MASS INDEX: 29.81 KG/M2

## 2025-05-05 DIAGNOSIS — I25.10 ATHEROSCLEROTIC HEART DISEASE OF NATIVE CORONARY ARTERY W/OUT ANGINA PECTORIS: ICD-10-CM

## 2025-05-05 DIAGNOSIS — I10 ESSENTIAL (PRIMARY) HYPERTENSION: ICD-10-CM

## 2025-05-05 DIAGNOSIS — E11.9 TYPE 2 DIABETES MELLITUS W/OUT COMPLICATIONS: ICD-10-CM

## 2025-05-05 DIAGNOSIS — R06.02 SHORTNESS OF BREATH: ICD-10-CM

## 2025-05-05 DIAGNOSIS — I49.3 VENTRICULAR PREMATURE DEPOLARIZATION: ICD-10-CM

## 2025-05-05 DIAGNOSIS — E04.2 NONTOXIC MULTINODULAR GOITER: ICD-10-CM

## 2025-05-05 DIAGNOSIS — I49.9 CARDIAC ARRHYTHMIA, UNSPECIFIED: ICD-10-CM

## 2025-05-05 PROCEDURE — 93000 ELECTROCARDIOGRAM COMPLETE: CPT

## 2025-05-05 PROCEDURE — 99214 OFFICE O/P EST MOD 30 MIN: CPT

## 2025-05-05 RX ORDER — HYOSCYAMINE SULFATE 0.12 MG/1
0.12 TABLET, ORALLY DISINTEGRATING ORAL 4 TIMES DAILY
Refills: 0 | Status: ACTIVE | COMMUNITY
Start: 2025-05-05

## 2025-05-19 ENCOUNTER — RX RENEWAL (OUTPATIENT)
Age: 75
End: 2025-05-19

## 2025-05-23 ENCOUNTER — APPOINTMENT (OUTPATIENT)
Dept: CARDIOLOGY | Facility: CLINIC | Age: 75
End: 2025-05-23